# Patient Record
Sex: FEMALE | Race: WHITE | NOT HISPANIC OR LATINO | Employment: OTHER | URBAN - METROPOLITAN AREA
[De-identification: names, ages, dates, MRNs, and addresses within clinical notes are randomized per-mention and may not be internally consistent; named-entity substitution may affect disease eponyms.]

---

## 2023-02-01 PROBLEM — I34.1 MITRAL VALVE PROLAPSE: Status: ACTIVE | Noted: 2023-02-01

## 2023-02-01 PROBLEM — M54.9 BACK PAIN: Status: ACTIVE | Noted: 2023-02-01

## 2023-02-01 PROBLEM — R31.9 HEMATURIA: Status: ACTIVE | Noted: 2023-02-01

## 2023-02-01 PROBLEM — K86.9 PANCREATIC LESION (HHS-HCC): Status: ACTIVE | Noted: 2023-02-01

## 2023-02-01 PROBLEM — K52.9 CHRONIC DIARRHEA: Status: ACTIVE | Noted: 2023-02-01

## 2023-02-01 PROBLEM — R35.0 URINARY FREQUENCY: Status: ACTIVE | Noted: 2023-02-01

## 2023-02-01 PROBLEM — E78.5 HYPERLIPIDEMIA: Status: ACTIVE | Noted: 2023-02-01

## 2023-02-01 PROBLEM — E83.51 HYPOCALCEMIA: Status: ACTIVE | Noted: 2023-02-01

## 2023-02-01 PROBLEM — R61 NIGHT SWEATS: Status: ACTIVE | Noted: 2023-02-01

## 2023-02-01 PROBLEM — M10.9 GOUT: Status: ACTIVE | Noted: 2023-02-01

## 2023-02-01 PROBLEM — M79.672 BILATERAL FOOT PAIN: Status: ACTIVE | Noted: 2023-02-01

## 2023-02-01 PROBLEM — I25.10 ASCVD (ARTERIOSCLEROTIC CARDIOVASCULAR DISEASE): Status: ACTIVE | Noted: 2023-02-01

## 2023-02-01 PROBLEM — I50.9 CHF (CONGESTIVE HEART FAILURE) (MULTI): Status: ACTIVE | Noted: 2023-02-01

## 2023-02-01 PROBLEM — E55.9 VITAMIN D INSUFFICIENCY: Status: ACTIVE | Noted: 2023-02-01

## 2023-02-01 PROBLEM — R42 VERTIGO: Status: ACTIVE | Noted: 2023-02-01

## 2023-02-01 PROBLEM — M79.642 BILATERAL HAND PAIN: Status: ACTIVE | Noted: 2023-02-01

## 2023-02-01 PROBLEM — M19.90 OSTEOARTHRITIS: Status: ACTIVE | Noted: 2023-02-01

## 2023-02-01 PROBLEM — G56.23 ULNAR NEUROPATHY OF BOTH UPPER EXTREMITIES: Status: ACTIVE | Noted: 2023-02-01

## 2023-02-01 PROBLEM — G47.34 OXYGEN DESATURATION DURING SLEEP: Status: ACTIVE | Noted: 2023-02-01

## 2023-02-01 PROBLEM — K57.90 DIVERTICULOSIS: Status: ACTIVE | Noted: 2023-02-01

## 2023-02-01 PROBLEM — S23.29XA COSTOCHONDRAL SEPARATION: Status: ACTIVE | Noted: 2023-02-01

## 2023-02-01 PROBLEM — I42.9 CARDIOMYOPATHY (MULTI): Status: ACTIVE | Noted: 2023-02-01

## 2023-02-01 PROBLEM — N18.30 CKD (CHRONIC KIDNEY DISEASE), STAGE III (MULTI): Status: ACTIVE | Noted: 2023-02-01

## 2023-02-01 PROBLEM — J84.10 PULMONARY FIBROSIS (MULTI): Status: ACTIVE | Noted: 2023-02-01

## 2023-02-01 PROBLEM — I10 HYPERTENSION: Status: ACTIVE | Noted: 2023-02-01

## 2023-02-01 PROBLEM — I87.2 VENOUS INSUFFICIENCY: Status: ACTIVE | Noted: 2023-02-01

## 2023-02-01 PROBLEM — J84.9 ILD (INTERSTITIAL LUNG DISEASE) (MULTI): Status: ACTIVE | Noted: 2023-02-01

## 2023-02-01 PROBLEM — R82.90 ABNORMAL URINE FINDING: Status: ACTIVE | Noted: 2023-02-01

## 2023-02-01 PROBLEM — N39.0 ACUTE UTI: Status: ACTIVE | Noted: 2023-02-01

## 2023-02-01 PROBLEM — M79.641 BILATERAL HAND PAIN: Status: ACTIVE | Noted: 2023-02-01

## 2023-02-01 PROBLEM — M79.671 BILATERAL FOOT PAIN: Status: ACTIVE | Noted: 2023-02-01

## 2023-02-01 PROBLEM — J44.9 CHRONIC OBSTRUCTIVE PULMONARY DISEASE (MULTI): Status: ACTIVE | Noted: 2023-02-01

## 2023-02-01 PROBLEM — M47.816 LUMBAR SPONDYLOSIS: Status: ACTIVE | Noted: 2023-02-01

## 2023-02-01 PROBLEM — R49.0 HOARSENESS: Status: ACTIVE | Noted: 2023-02-01

## 2023-02-01 PROBLEM — R80.9 PROTEINURIA: Status: ACTIVE | Noted: 2023-02-01

## 2023-02-01 PROBLEM — D64.9 ANEMIA: Status: ACTIVE | Noted: 2023-02-01

## 2023-02-01 PROBLEM — R35.1 NOCTURIA: Status: ACTIVE | Noted: 2023-02-01

## 2023-02-01 PROBLEM — K63.5 COLON POLYP: Status: ACTIVE | Noted: 2023-02-01

## 2023-02-01 PROBLEM — J45.909 ASTHMA, MILD (HHS-HCC): Status: ACTIVE | Noted: 2023-02-01

## 2023-02-01 PROBLEM — M81.0 OSTEOPOROSIS: Status: ACTIVE | Noted: 2023-02-01

## 2023-02-01 PROBLEM — I07.1 TRICUSPID VALVE INSUFFICIENCY: Status: ACTIVE | Noted: 2023-02-01

## 2023-02-01 PROBLEM — N89.8 VAGINAL DISCHARGE, BLOODY: Status: ACTIVE | Noted: 2023-02-01

## 2023-02-01 PROBLEM — R10.32 LEFT LOWER QUADRANT ABDOMINAL PAIN: Status: ACTIVE | Noted: 2023-02-01

## 2023-02-01 PROBLEM — K42.9 UMBILICAL HERNIA: Status: ACTIVE | Noted: 2023-02-01

## 2023-02-01 PROBLEM — E11.43: Status: ACTIVE | Noted: 2023-02-01

## 2023-02-01 PROBLEM — K21.9 GERD WITHOUT ESOPHAGITIS: Status: ACTIVE | Noted: 2023-02-01

## 2023-02-01 PROBLEM — R60.9 EDEMA: Status: ACTIVE | Noted: 2023-02-01

## 2023-02-01 PROBLEM — K76.0 FATTY LIVER: Status: ACTIVE | Noted: 2023-02-01

## 2023-02-25 LAB
ALANINE AMINOTRANSFERASE (SGPT) (U/L) IN SER/PLAS: 10 U/L (ref 7–45)
ALBUMIN (G/DL) IN SER/PLAS: 4.2 G/DL (ref 3.4–5)
ALKALINE PHOSPHATASE (U/L) IN SER/PLAS: 70 U/L (ref 33–136)
ANION GAP IN SER/PLAS: 17 MMOL/L (ref 10–20)
ASPARTATE AMINOTRANSFERASE (SGOT) (U/L) IN SER/PLAS: 15 U/L (ref 9–39)
BASOPHILS (10*3/UL) IN BLOOD BY AUTOMATED COUNT: 0.04 X10E9/L (ref 0–0.1)
BASOPHILS/100 LEUKOCYTES IN BLOOD BY AUTOMATED COUNT: 0.3 % (ref 0–2)
BILIRUBIN TOTAL (MG/DL) IN SER/PLAS: 0.3 MG/DL (ref 0–1.2)
CALCIUM (MG/DL) IN SER/PLAS: 9.7 MG/DL (ref 8.6–10.6)
CARBON DIOXIDE, TOTAL (MMOL/L) IN SER/PLAS: 23 MMOL/L (ref 21–32)
CHLORIDE (MMOL/L) IN SER/PLAS: 102 MMOL/L (ref 98–107)
CREATININE (MG/DL) IN SER/PLAS: 2.55 MG/DL (ref 0.5–1.05)
EOSINOPHILS (10*3/UL) IN BLOOD BY AUTOMATED COUNT: 0.34 X10E9/L (ref 0–0.4)
EOSINOPHILS/100 LEUKOCYTES IN BLOOD BY AUTOMATED COUNT: 3 % (ref 0–6)
ERYTHROCYTE DISTRIBUTION WIDTH (RATIO) BY AUTOMATED COUNT: 14.4 % (ref 11.5–14.5)
ERYTHROCYTE MEAN CORPUSCULAR HEMOGLOBIN CONCENTRATION (G/DL) BY AUTOMATED: 32.7 G/DL (ref 32–36)
ERYTHROCYTE MEAN CORPUSCULAR VOLUME (FL) BY AUTOMATED COUNT: 93 FL (ref 80–100)
ERYTHROCYTES (10*6/UL) IN BLOOD BY AUTOMATED COUNT: 4.18 X10E12/L (ref 4–5.2)
GFR FEMALE: 19 ML/MIN/1.73M2
GLUCOSE (MG/DL) IN SER/PLAS: 68 MG/DL (ref 74–99)
HEMATOCRIT (%) IN BLOOD BY AUTOMATED COUNT: 38.8 % (ref 36–46)
HEMOGLOBIN (G/DL) IN BLOOD: 12.7 G/DL (ref 12–16)
IMMATURE GRANULOCYTES/100 LEUKOCYTES IN BLOOD BY AUTOMATED COUNT: 0.3 % (ref 0–0.9)
LEUKOCYTES (10*3/UL) IN BLOOD BY AUTOMATED COUNT: 11.5 X10E9/L (ref 4.4–11.3)
LYMPHOCYTES (10*3/UL) IN BLOOD BY AUTOMATED COUNT: 3.8 X10E9/L (ref 0.8–3)
LYMPHOCYTES/100 LEUKOCYTES IN BLOOD BY AUTOMATED COUNT: 33 % (ref 13–44)
MONOCYTES (10*3/UL) IN BLOOD BY AUTOMATED COUNT: 0.84 X10E9/L (ref 0.05–0.8)
MONOCYTES/100 LEUKOCYTES IN BLOOD BY AUTOMATED COUNT: 7.3 % (ref 2–10)
NEUTROPHILS (10*3/UL) IN BLOOD BY AUTOMATED COUNT: 6.47 X10E9/L (ref 1.6–5.5)
NEUTROPHILS/100 LEUKOCYTES IN BLOOD BY AUTOMATED COUNT: 56.1 % (ref 40–80)
NRBC (PER 100 WBCS) BY AUTOMATED COUNT: 0 /100 WBC (ref 0–0)
PLATELETS (10*3/UL) IN BLOOD AUTOMATED COUNT: 329 X10E9/L (ref 150–450)
POTASSIUM (MMOL/L) IN SER/PLAS: 5.7 MMOL/L (ref 3.5–5.3)
PROTEIN TOTAL: 7.6 G/DL (ref 6.4–8.2)
SODIUM (MMOL/L) IN SER/PLAS: 136 MMOL/L (ref 136–145)
UREA NITROGEN (MG/DL) IN SER/PLAS: 56 MG/DL (ref 6–23)

## 2023-03-02 LAB
ANION GAP IN SER/PLAS: 14 MMOL/L (ref 10–20)
BASOPHILS (10*3/UL) IN BLOOD BY AUTOMATED COUNT: 0.03 X10E9/L (ref 0–0.1)
BASOPHILS/100 LEUKOCYTES IN BLOOD BY AUTOMATED COUNT: 0.3 % (ref 0–2)
CALCIUM (MG/DL) IN SER/PLAS: 9.3 MG/DL (ref 8.6–10.3)
CARBON DIOXIDE, TOTAL (MMOL/L) IN SER/PLAS: 25 MMOL/L (ref 21–32)
CHLORIDE (MMOL/L) IN SER/PLAS: 102 MMOL/L (ref 98–107)
CREATININE (MG/DL) IN SER/PLAS: 1.5 MG/DL (ref 0.5–1.05)
EOSINOPHILS (10*3/UL) IN BLOOD BY AUTOMATED COUNT: 0.05 X10E9/L (ref 0–0.4)
EOSINOPHILS/100 LEUKOCYTES IN BLOOD BY AUTOMATED COUNT: 0.4 % (ref 0–6)
ERYTHROCYTE DISTRIBUTION WIDTH (RATIO) BY AUTOMATED COUNT: 14.4 % (ref 11.5–14.5)
ERYTHROCYTE MEAN CORPUSCULAR HEMOGLOBIN CONCENTRATION (G/DL) BY AUTOMATED: 33 G/DL (ref 32–36)
ERYTHROCYTE MEAN CORPUSCULAR VOLUME (FL) BY AUTOMATED COUNT: 93 FL (ref 80–100)
ERYTHROCYTES (10*6/UL) IN BLOOD BY AUTOMATED COUNT: 3.82 X10E12/L (ref 4–5.2)
GFR FEMALE: 35 ML/MIN/1.73M2
GLUCOSE (MG/DL) IN SER/PLAS: 242 MG/DL (ref 74–99)
HEMATOCRIT (%) IN BLOOD BY AUTOMATED COUNT: 35.5 % (ref 36–46)
HEMOGLOBIN (G/DL) IN BLOOD: 11.7 G/DL (ref 12–16)
IMMATURE GRANULOCYTES/100 LEUKOCYTES IN BLOOD BY AUTOMATED COUNT: 0.4 % (ref 0–0.9)
LEUKOCYTES (10*3/UL) IN BLOOD BY AUTOMATED COUNT: 11.4 X10E9/L (ref 4.4–11.3)
LYMPHOCYTES (10*3/UL) IN BLOOD BY AUTOMATED COUNT: 2.04 X10E9/L (ref 0.8–3)
LYMPHOCYTES/100 LEUKOCYTES IN BLOOD BY AUTOMATED COUNT: 18 % (ref 13–44)
MONOCYTES (10*3/UL) IN BLOOD BY AUTOMATED COUNT: 0.54 X10E9/L (ref 0.05–0.8)
MONOCYTES/100 LEUKOCYTES IN BLOOD BY AUTOMATED COUNT: 4.8 % (ref 2–10)
NEUTROPHILS (10*3/UL) IN BLOOD BY AUTOMATED COUNT: 8.65 X10E9/L (ref 1.6–5.5)
NEUTROPHILS/100 LEUKOCYTES IN BLOOD BY AUTOMATED COUNT: 76.1 % (ref 40–80)
PLATELETS (10*3/UL) IN BLOOD AUTOMATED COUNT: 280 X10E9/L (ref 150–450)
POTASSIUM (MMOL/L) IN SER/PLAS: 4.1 MMOL/L (ref 3.5–5.3)
SODIUM (MMOL/L) IN SER/PLAS: 137 MMOL/L (ref 136–145)
UREA NITROGEN (MG/DL) IN SER/PLAS: 26 MG/DL (ref 6–23)

## 2023-03-03 LAB — C. DIFFICILE TOXIN, PCR: NOT DETECTED

## 2023-03-08 ENCOUNTER — OFFICE VISIT (OUTPATIENT)
Dept: PRIMARY CARE | Facility: CLINIC | Age: 78
End: 2023-03-08
Payer: MEDICARE

## 2023-03-08 VITALS
BODY MASS INDEX: 26.96 KG/M2 | SYSTOLIC BLOOD PRESSURE: 106 MMHG | WEIGHT: 162 LBS | DIASTOLIC BLOOD PRESSURE: 56 MMHG | TEMPERATURE: 97.7 F

## 2023-03-08 DIAGNOSIS — J44.9 CHRONIC OBSTRUCTIVE PULMONARY DISEASE, UNSPECIFIED COPD TYPE (MULTI): ICD-10-CM

## 2023-03-08 DIAGNOSIS — E11.43 CONTROLLED TYPE 2 DIABETES MELLITUS WITH DIABETIC AUTONOMIC NEUROPATHY, WITH LONG-TERM CURRENT USE OF INSULIN (MULTI): ICD-10-CM

## 2023-03-08 DIAGNOSIS — J45.909 MILD ASTHMA, UNSPECIFIED WHETHER COMPLICATED, UNSPECIFIED WHETHER PERSISTENT (HHS-HCC): Primary | ICD-10-CM

## 2023-03-08 DIAGNOSIS — Z79.4 CONTROLLED TYPE 2 DIABETES MELLITUS WITH DIABETIC AUTONOMIC NEUROPATHY, WITH LONG-TERM CURRENT USE OF INSULIN (MULTI): ICD-10-CM

## 2023-03-08 PROCEDURE — 99214 OFFICE O/P EST MOD 30 MIN: CPT | Performed by: FAMILY MEDICINE

## 2023-03-08 PROCEDURE — 1036F TOBACCO NON-USER: CPT | Performed by: FAMILY MEDICINE

## 2023-03-08 PROCEDURE — 83036 HEMOGLOBIN GLYCOSYLATED A1C: CPT | Performed by: FAMILY MEDICINE

## 2023-03-08 PROCEDURE — 1157F ADVNC CARE PLAN IN RCRD: CPT | Performed by: FAMILY MEDICINE

## 2023-03-08 PROCEDURE — 1160F RVW MEDS BY RX/DR IN RCRD: CPT | Performed by: FAMILY MEDICINE

## 2023-03-08 PROCEDURE — 3078F DIAST BP <80 MM HG: CPT | Performed by: FAMILY MEDICINE

## 2023-03-08 PROCEDURE — 3074F SYST BP LT 130 MM HG: CPT | Performed by: FAMILY MEDICINE

## 2023-03-08 PROCEDURE — 1159F MED LIST DOCD IN RCRD: CPT | Performed by: FAMILY MEDICINE

## 2023-03-13 PROBLEM — R82.90 ABNORMAL URINE FINDING: Status: RESOLVED | Noted: 2023-02-01 | Resolved: 2023-03-13

## 2023-03-13 PROBLEM — N39.0 ACUTE UTI: Status: RESOLVED | Noted: 2023-02-01 | Resolved: 2023-03-13

## 2023-03-13 LAB — POC HEMOGLOBIN A1C: 6.4 % (ref 4.2–6.5)

## 2023-03-13 NOTE — PROGRESS NOTES
Subjective   Patient ID: [id number]     Genesis Canela is a 77 y.o. female who presents for Med Management.    HPI  Genesis is here to follow up on her medical problems today.  Review of Systems    PSYCH-No complaints regarding libido, appetite, memory or concentration;  no drug use or alcohol usage over six per week  SLEEP-No complaints of insomnia, apnea or restless legs  ALL/IMMUN-No history of sneezing or itching  HEM-No unexplained bruising or bleeding    Objective     /56 (BP Location: Left arm, Patient Position: Sitting)   Temp 36.5 °C (97.7 °F)   Wt 73.5 kg (162 lb)   BMI 26.96 kg/m²      Physical Exam    Heart- regular rate and rhythm, normal s1 and s2 without murmur or gallop;  pedal edema 2+  Lungs-clear to auscultation  Abdomen-soft, positive bowel sounds, without masses, HSmegaly or pain   Assessment/Plan     Problem List Items Addressed This Visit          Nervous    Controlled type 2 diabetes mellitus with diabetic autonomic neuropathy (CMS/HCC)    Relevant Orders    POCT glycosylated hemoglobin (Hb A1C) manually resulted (Completed)       Respiratory    Asthma, mild - Primary    Chronic obstructive pulmonary disease (CMS/HCC)       Ramez Vásquez MD

## 2023-03-17 ENCOUNTER — APPOINTMENT (OUTPATIENT)
Dept: PRIMARY CARE | Facility: CLINIC | Age: 78
End: 2023-03-17
Payer: MEDICARE

## 2023-04-04 ENCOUNTER — OFFICE VISIT (OUTPATIENT)
Dept: PRIMARY CARE | Facility: CLINIC | Age: 78
End: 2023-04-04
Payer: MEDICARE

## 2023-04-04 VITALS — SYSTOLIC BLOOD PRESSURE: 112 MMHG | DIASTOLIC BLOOD PRESSURE: 60 MMHG | TEMPERATURE: 98 F

## 2023-04-04 DIAGNOSIS — R35.0 URINARY FREQUENCY: Primary | ICD-10-CM

## 2023-04-04 DIAGNOSIS — M25.551 PAIN OF RIGHT HIP: ICD-10-CM

## 2023-04-04 PROCEDURE — 3078F DIAST BP <80 MM HG: CPT | Performed by: FAMILY MEDICINE

## 2023-04-04 PROCEDURE — 99213 OFFICE O/P EST LOW 20 MIN: CPT | Performed by: FAMILY MEDICINE

## 2023-04-04 PROCEDURE — 1159F MED LIST DOCD IN RCRD: CPT | Performed by: FAMILY MEDICINE

## 2023-04-04 PROCEDURE — 1036F TOBACCO NON-USER: CPT | Performed by: FAMILY MEDICINE

## 2023-04-04 PROCEDURE — 1157F ADVNC CARE PLAN IN RCRD: CPT | Performed by: FAMILY MEDICINE

## 2023-04-04 PROCEDURE — 3074F SYST BP LT 130 MM HG: CPT | Performed by: FAMILY MEDICINE

## 2023-04-04 PROCEDURE — 1160F RVW MEDS BY RX/DR IN RCRD: CPT | Performed by: FAMILY MEDICINE

## 2023-04-04 RX ORDER — NAPROXEN 500 MG/1
500 TABLET ORAL 2 TIMES DAILY PRN
Qty: 14 TABLET | Refills: 0 | Status: SHIPPED | OUTPATIENT
Start: 2023-04-04 | End: 2023-04-11

## 2023-04-04 RX ORDER — SULFAMETHOXAZOLE AND TRIMETHOPRIM 800; 160 MG/1; MG/1
1 TABLET ORAL DAILY
COMMUNITY
Start: 2023-03-08 | End: 2023-10-12 | Stop reason: ALTCHOICE

## 2023-04-04 NOTE — PROGRESS NOTES
Subjective   Patient ID: [id number]     Genesis Canela is a 77 y.o. female who presents for Right buttock pain.    HPI  Has had pain in right buttock for three days without injury;  this is interfering with walking;  not bad while sitting;  has some radiation down her right thigh; it is interfering with her making it to the bathroom;  worse when lifting her right leg while driving  Review of Systems    Neuro-no weakness  GI-No blood in stool, tarry stools, pain, vomiting, heartburn, constipation;  she has chronic diarrhea  PULM-No wheezing, coughing or shortness of breath  UROL-No incontinence; chronic urinary frequency    Objective     /60 (BP Location: Left arm, Patient Position: Sitting)   Temp 36.7 °C (98 °F) (Skin)      Physical Exam    Gen-patient unable to get on the table for exam;  here with walker  MS-no SI, CVA or spine tenderness; no deep gluteal pain; positive straight leg raising sign on the right  Abdomen-soft, without masses, or pain   Hip-examined in chair and appears to have normal range of motion    Assessment/Plan     Problem List Items Addressed This Visit          Other    Urinary frequency - Primary     Other Visit Diagnoses       Pain of right hip        Relevant Medications    naproxen (Naprosyn) 500 mg tablet    Other Relevant Orders    XR lumbar spine 4+ views w flexion extension    Referral to Physical Therapy            Ramez Vásquez MD

## 2023-04-04 NOTE — PATIENT INSTRUCTIONS
Do the back exercises provided about 30' after taking your medications. Call within the hour if you develop weakness in your legs, incontinence of stool or urine, or a fever.  If your back pain is not resolved within one week please return to be rechecked.

## 2023-04-24 ENCOUNTER — OFFICE VISIT (OUTPATIENT)
Dept: PRIMARY CARE | Facility: CLINIC | Age: 78
End: 2023-04-24
Payer: MEDICARE

## 2023-04-24 VITALS
WEIGHT: 161 LBS | DIASTOLIC BLOOD PRESSURE: 54 MMHG | TEMPERATURE: 97 F | SYSTOLIC BLOOD PRESSURE: 102 MMHG | BODY MASS INDEX: 26.79 KG/M2

## 2023-04-24 DIAGNOSIS — R10.12 LEFT UPPER QUADRANT ABDOMINAL PAIN: ICD-10-CM

## 2023-04-24 DIAGNOSIS — M81.0 OSTEOPOROSIS, UNSPECIFIED OSTEOPOROSIS TYPE, UNSPECIFIED PATHOLOGICAL FRACTURE PRESENCE: Primary | ICD-10-CM

## 2023-04-24 DIAGNOSIS — Z00.00 HEALTH CARE MAINTENANCE: ICD-10-CM

## 2023-04-24 DIAGNOSIS — Z12.31 ENCOUNTER FOR SCREENING MAMMOGRAM FOR MALIGNANT NEOPLASM OF BREAST: ICD-10-CM

## 2023-04-24 PROCEDURE — 36415 COLL VENOUS BLD VENIPUNCTURE: CPT | Performed by: FAMILY MEDICINE

## 2023-04-24 PROCEDURE — 1159F MED LIST DOCD IN RCRD: CPT | Performed by: FAMILY MEDICINE

## 2023-04-24 PROCEDURE — 1036F TOBACCO NON-USER: CPT | Performed by: FAMILY MEDICINE

## 2023-04-24 PROCEDURE — 3078F DIAST BP <80 MM HG: CPT | Performed by: FAMILY MEDICINE

## 2023-04-24 PROCEDURE — 1157F ADVNC CARE PLAN IN RCRD: CPT | Performed by: FAMILY MEDICINE

## 2023-04-24 PROCEDURE — 1160F RVW MEDS BY RX/DR IN RCRD: CPT | Performed by: FAMILY MEDICINE

## 2023-04-24 PROCEDURE — 85025 COMPLETE CBC W/AUTO DIFF WBC: CPT

## 2023-04-24 PROCEDURE — 80048 BASIC METABOLIC PNL TOTAL CA: CPT

## 2023-04-24 PROCEDURE — 99214 OFFICE O/P EST MOD 30 MIN: CPT | Performed by: FAMILY MEDICINE

## 2023-04-24 PROCEDURE — 3074F SYST BP LT 130 MM HG: CPT | Performed by: FAMILY MEDICINE

## 2023-04-24 RX ORDER — CIPROFLOXACIN 500 MG/1
1 TABLET ORAL 2 TIMES DAILY
COMMUNITY
Start: 2023-04-20 | End: 2023-05-01 | Stop reason: ALTCHOICE

## 2023-04-24 NOTE — PROGRESS NOTES
Subjective   Patient ID: [id number]     Genesis Canela is a 77 y.o. female who presents for Sciatica (Follow up for sciatica).    HPI  Saw Dr Santa and received a shot in her low back and that is feeling better withless limitation in ambulation now;    Review of Systems    GI- no UROL- has been on antibiotic for six months and is currently prescribed cipro by    ENDO- No change in hair, voice, skin, weight or temperature tolerance   MSK-No locking, giving way/swelling of joints; to start physical therapy on 02MAY2023  NEURO- No headaches, hx of concussion, falls in the last year or seizure  DERM-No rashes, blanching or  change in any moles     Objective     /54 (BP Location: Left arm, Patient Position: Sitting)   Temp 36.1 °C (97 °F) (Skin)   Wt 73 kg (161 lb)   BMI 26.79 kg/m²      Physical Exam    Neck-supple without lymphadenopathy or thyromegaly; no carotid bruits  Throat- without erythema or exudate, uvula in midlineNeck-supple without lymphadenopathy or thyromegaly; no carotid bruits  Heart- regular rate and rhythm, normal s1 and s2 without murmur or gallop  Lungs-clear to auscultation  Abdomen-soft, positive bowel sounds, without masses, HSmegaly or pain     Assessment/Plan     Problem List Items Addressed This Visit    None      Ramez Vásquez MD

## 2023-04-25 PROBLEM — D72.825 BANDEMIA: Status: ACTIVE | Noted: 2023-04-25

## 2023-04-25 LAB
ANION GAP IN SER/PLAS: 16 MMOL/L (ref 10–20)
BASOPHILS (10*3/UL) IN BLOOD BY AUTOMATED COUNT: 0.04 X10E9/L (ref 0–0.1)
BASOPHILS/100 LEUKOCYTES IN BLOOD BY AUTOMATED COUNT: 0.3 % (ref 0–2)
CALCIUM (MG/DL) IN SER/PLAS: 9.3 MG/DL (ref 8.6–10.6)
CARBON DIOXIDE, TOTAL (MMOL/L) IN SER/PLAS: 25 MMOL/L (ref 21–32)
CHLORIDE (MMOL/L) IN SER/PLAS: 104 MMOL/L (ref 98–107)
CREATININE (MG/DL) IN SER/PLAS: 1.04 MG/DL (ref 0.5–1.05)
EOSINOPHILS (10*3/UL) IN BLOOD BY AUTOMATED COUNT: 0.08 X10E9/L (ref 0–0.4)
EOSINOPHILS/100 LEUKOCYTES IN BLOOD BY AUTOMATED COUNT: 0.6 % (ref 0–6)
ERYTHROCYTE DISTRIBUTION WIDTH (RATIO) BY AUTOMATED COUNT: 14.1 % (ref 11.5–14.5)
ERYTHROCYTE MEAN CORPUSCULAR HEMOGLOBIN CONCENTRATION (G/DL) BY AUTOMATED: 30.8 G/DL (ref 32–36)
ERYTHROCYTE MEAN CORPUSCULAR VOLUME (FL) BY AUTOMATED COUNT: 97 FL (ref 80–100)
ERYTHROCYTES (10*6/UL) IN BLOOD BY AUTOMATED COUNT: 3.92 X10E12/L (ref 4–5.2)
GFR FEMALE: 54 ML/MIN/1.73M2
GLUCOSE (MG/DL) IN SER/PLAS: 99 MG/DL (ref 74–99)
HEMATOCRIT (%) IN BLOOD BY AUTOMATED COUNT: 38 % (ref 36–46)
HEMOGLOBIN (G/DL) IN BLOOD: 11.7 G/DL (ref 12–16)
IMMATURE GRANULOCYTES/100 LEUKOCYTES IN BLOOD BY AUTOMATED COUNT: 0.4 % (ref 0–0.9)
LEUKOCYTES (10*3/UL) IN BLOOD BY AUTOMATED COUNT: 13.3 X10E9/L (ref 4.4–11.3)
LYMPHOCYTES (10*3/UL) IN BLOOD BY AUTOMATED COUNT: 2.83 X10E9/L (ref 0.8–3)
LYMPHOCYTES/100 LEUKOCYTES IN BLOOD BY AUTOMATED COUNT: 21.3 % (ref 13–44)
MONOCYTES (10*3/UL) IN BLOOD BY AUTOMATED COUNT: 0.86 X10E9/L (ref 0.05–0.8)
MONOCYTES/100 LEUKOCYTES IN BLOOD BY AUTOMATED COUNT: 6.5 % (ref 2–10)
NEUTROPHILS (10*3/UL) IN BLOOD BY AUTOMATED COUNT: 9.45 X10E9/L (ref 1.6–5.5)
NEUTROPHILS/100 LEUKOCYTES IN BLOOD BY AUTOMATED COUNT: 70.9 % (ref 40–80)
NRBC (PER 100 WBCS) BY AUTOMATED COUNT: 0 /100 WBC (ref 0–0)
PLATELETS (10*3/UL) IN BLOOD AUTOMATED COUNT: 302 X10E9/L (ref 150–450)
POTASSIUM (MMOL/L) IN SER/PLAS: 4.6 MMOL/L (ref 3.5–5.3)
SODIUM (MMOL/L) IN SER/PLAS: 140 MMOL/L (ref 136–145)
UREA NITROGEN (MG/DL) IN SER/PLAS: 22 MG/DL (ref 6–23)

## 2023-05-01 ENCOUNTER — OFFICE VISIT (OUTPATIENT)
Dept: PRIMARY CARE | Facility: CLINIC | Age: 78
End: 2023-05-01
Payer: MEDICARE

## 2023-05-01 VITALS
DIASTOLIC BLOOD PRESSURE: 66 MMHG | BODY MASS INDEX: 26.63 KG/M2 | WEIGHT: 160 LBS | TEMPERATURE: 96.6 F | SYSTOLIC BLOOD PRESSURE: 114 MMHG

## 2023-05-01 DIAGNOSIS — A04.8 OTHER SPECIFIED BACTERIAL INTESTINAL INFECTIONS: ICD-10-CM

## 2023-05-01 DIAGNOSIS — K52.9 CHRONIC DIARRHEA: Primary | ICD-10-CM

## 2023-05-01 PROCEDURE — 99213 OFFICE O/P EST LOW 20 MIN: CPT | Performed by: FAMILY MEDICINE

## 2023-05-01 PROCEDURE — 3078F DIAST BP <80 MM HG: CPT | Performed by: FAMILY MEDICINE

## 2023-05-01 PROCEDURE — 1157F ADVNC CARE PLAN IN RCRD: CPT | Performed by: FAMILY MEDICINE

## 2023-05-01 PROCEDURE — 36415 COLL VENOUS BLD VENIPUNCTURE: CPT | Performed by: FAMILY MEDICINE

## 2023-05-01 PROCEDURE — 85025 COMPLETE CBC W/AUTO DIFF WBC: CPT

## 2023-05-01 PROCEDURE — 1159F MED LIST DOCD IN RCRD: CPT | Performed by: FAMILY MEDICINE

## 2023-05-01 PROCEDURE — 3074F SYST BP LT 130 MM HG: CPT | Performed by: FAMILY MEDICINE

## 2023-05-01 PROCEDURE — 80048 BASIC METABOLIC PNL TOTAL CA: CPT

## 2023-05-01 PROCEDURE — 1160F RVW MEDS BY RX/DR IN RCRD: CPT | Performed by: FAMILY MEDICINE

## 2023-05-01 PROCEDURE — 1036F TOBACCO NON-USER: CPT | Performed by: FAMILY MEDICINE

## 2023-05-01 NOTE — PROGRESS NOTES
Subjective   Patient ID: [id number]     Genesis Canela is a 77 y.o. female who presents for Follow-up (Abnormal labs).    HPI  Genesis states that she has had pain in left upper quadrant since 20APR2023;  has been treated for GERD, diverticulitis and right sided sciatica in the preceding months but this pain is different;  a CT scan is pending approval for the 9th of MAY  Review of Systems    CARDIO- No chest pain or pressure, nausea, diaphoresis, paresthesias, dizziness, or syncope with or without exertion  GI-No blood in stool, tarry stools, pain, vomiting, heartburn (less than weekly), constipation but has had diarrhea for months  UROL-finished three day course of cipro for UTI today and sees Dr Brian Patino in one week    Objective     /66 (BP Location: Left arm, Patient Position: Sitting)   Temp 35.9 °C (96.6 °F) (Skin)   Wt 72.6 kg (160 lb)   BMI 26.63 kg/m²      Physical Exam    Gen- patient in NAD  Heart- regular rate and rhythm, normal s1 and s2 without murmur or gallop  Lungs-clear to auscultation  Abdomen-soft, positive bowel sounds, without masses, HSmegaly but with exquisite left upper and left lower quadrant pain without rebound or Rovsing;  no CVA or spine tenderness    Assessment/Plan     Problem List Items Addressed This Visit          Digestive    Chronic diarrhea - Primary    Relevant Orders    C. difficile, PCR    CBC and Auto Differential    Basic Metabolic Panel    Stool Pathogen Panel, PCR    Ova/Para + Giardia/Cryptosporidium Antigen     Other Visit Diagnoses       Other specified bacterial intestinal infections        Relevant Orders    Stool Pathogen Panel, PCR            Ramez Vásquez MD

## 2023-05-02 LAB
ANION GAP IN SER/PLAS: 15 MMOL/L (ref 10–20)
BASOPHILS (10*3/UL) IN BLOOD BY AUTOMATED COUNT: 0.05 X10E9/L (ref 0–0.1)
BASOPHILS/100 LEUKOCYTES IN BLOOD BY AUTOMATED COUNT: 0.4 % (ref 0–2)
CALCIUM (MG/DL) IN SER/PLAS: 8.5 MG/DL (ref 8.6–10.6)
CARBON DIOXIDE, TOTAL (MMOL/L) IN SER/PLAS: 25 MMOL/L (ref 21–32)
CHLORIDE (MMOL/L) IN SER/PLAS: 107 MMOL/L (ref 98–107)
CREATININE (MG/DL) IN SER/PLAS: 1.12 MG/DL (ref 0.5–1.05)
EOSINOPHILS (10*3/UL) IN BLOOD BY AUTOMATED COUNT: 0.27 X10E9/L (ref 0–0.4)
EOSINOPHILS/100 LEUKOCYTES IN BLOOD BY AUTOMATED COUNT: 2.3 % (ref 0–6)
ERYTHROCYTE DISTRIBUTION WIDTH (RATIO) BY AUTOMATED COUNT: 13.7 % (ref 11.5–14.5)
ERYTHROCYTE MEAN CORPUSCULAR HEMOGLOBIN CONCENTRATION (G/DL) BY AUTOMATED: 30.9 G/DL (ref 32–36)
ERYTHROCYTE MEAN CORPUSCULAR VOLUME (FL) BY AUTOMATED COUNT: 96 FL (ref 80–100)
ERYTHROCYTES (10*6/UL) IN BLOOD BY AUTOMATED COUNT: 3.82 X10E12/L (ref 4–5.2)
GFR FEMALE: 50 ML/MIN/1.73M2
GLUCOSE (MG/DL) IN SER/PLAS: 231 MG/DL (ref 74–99)
HEMATOCRIT (%) IN BLOOD BY AUTOMATED COUNT: 36.6 % (ref 36–46)
HEMOGLOBIN (G/DL) IN BLOOD: 11.3 G/DL (ref 12–16)
IMMATURE GRANULOCYTES/100 LEUKOCYTES IN BLOOD BY AUTOMATED COUNT: 0.3 % (ref 0–0.9)
LEUKOCYTES (10*3/UL) IN BLOOD BY AUTOMATED COUNT: 11.7 X10E9/L (ref 4.4–11.3)
LYMPHOCYTES (10*3/UL) IN BLOOD BY AUTOMATED COUNT: 2.94 X10E9/L (ref 0.8–3)
LYMPHOCYTES/100 LEUKOCYTES IN BLOOD BY AUTOMATED COUNT: 25 % (ref 13–44)
MONOCYTES (10*3/UL) IN BLOOD BY AUTOMATED COUNT: 0.68 X10E9/L (ref 0.05–0.8)
MONOCYTES/100 LEUKOCYTES IN BLOOD BY AUTOMATED COUNT: 5.8 % (ref 2–10)
NEUTROPHILS (10*3/UL) IN BLOOD BY AUTOMATED COUNT: 7.76 X10E9/L (ref 1.6–5.5)
NEUTROPHILS/100 LEUKOCYTES IN BLOOD BY AUTOMATED COUNT: 66.2 % (ref 40–80)
PLATELETS (10*3/UL) IN BLOOD AUTOMATED COUNT: 302 X10E9/L (ref 150–450)
POTASSIUM (MMOL/L) IN SER/PLAS: 4.7 MMOL/L (ref 3.5–5.3)
SODIUM (MMOL/L) IN SER/PLAS: 142 MMOL/L (ref 136–145)
UREA NITROGEN (MG/DL) IN SER/PLAS: 21 MG/DL (ref 6–23)

## 2023-05-05 ENCOUNTER — APPOINTMENT (OUTPATIENT)
Dept: PRIMARY CARE | Facility: CLINIC | Age: 78
End: 2023-05-05
Payer: MEDICARE

## 2023-05-05 ENCOUNTER — TELEPHONE (OUTPATIENT)
Dept: PRIMARY CARE | Facility: CLINIC | Age: 78
End: 2023-05-05

## 2023-05-05 NOTE — TELEPHONE ENCOUNTER
Received an email from precert dept for Genesis's CT abdomen. Called [facility] at [phone] regarding case# [id number] and spoke with Nubia CARR who told me that this was denied and a peer to peer could not be done. An appeal must be faxed to [employer]. She said we need to fax the office note and denial letter to [phone]. I explained that we do not have a denial letter so she will fax that to us.

## 2023-05-10 ENCOUNTER — PATIENT OUTREACH (OUTPATIENT)
Dept: CARE COORDINATION | Facility: CLINIC | Age: 78
End: 2023-05-10
Payer: MEDICARE

## 2023-05-16 ENCOUNTER — OFFICE VISIT (OUTPATIENT)
Dept: PRIMARY CARE | Facility: CLINIC | Age: 78
End: 2023-05-16
Payer: MEDICARE

## 2023-05-16 VITALS
DIASTOLIC BLOOD PRESSURE: 62 MMHG | TEMPERATURE: 97.1 F | BODY MASS INDEX: 26.79 KG/M2 | SYSTOLIC BLOOD PRESSURE: 128 MMHG | WEIGHT: 161 LBS

## 2023-05-16 DIAGNOSIS — K57.92 DIVERTICULITIS: Primary | ICD-10-CM

## 2023-05-16 PROCEDURE — 3078F DIAST BP <80 MM HG: CPT | Performed by: FAMILY MEDICINE

## 2023-05-16 PROCEDURE — 1159F MED LIST DOCD IN RCRD: CPT | Performed by: FAMILY MEDICINE

## 2023-05-16 PROCEDURE — 1160F RVW MEDS BY RX/DR IN RCRD: CPT | Performed by: FAMILY MEDICINE

## 2023-05-16 PROCEDURE — 1157F ADVNC CARE PLAN IN RCRD: CPT | Performed by: FAMILY MEDICINE

## 2023-05-16 PROCEDURE — 99213 OFFICE O/P EST LOW 20 MIN: CPT | Performed by: FAMILY MEDICINE

## 2023-05-16 PROCEDURE — 1036F TOBACCO NON-USER: CPT | Performed by: FAMILY MEDICINE

## 2023-05-16 PROCEDURE — 3074F SYST BP LT 130 MM HG: CPT | Performed by: FAMILY MEDICINE

## 2023-05-16 RX ORDER — AMOXICILLIN AND CLAVULANATE POTASSIUM 875; 125 MG/1; MG/1
1 TABLET, FILM COATED ORAL 2 TIMES DAILY
COMMUNITY
Start: 2023-05-05 | End: 2023-08-09 | Stop reason: ALTCHOICE

## 2023-05-16 RX ORDER — VIBEGRON 75 MG/1
1 TABLET, FILM COATED ORAL ONCE
COMMUNITY
End: 2023-10-12 | Stop reason: ALTCHOICE

## 2023-05-16 NOTE — PROGRESS NOTES
Subjective   Patient ID: [id number]     Genesis Canela is a 77 y.o. female who presents for Hospital Follow-up.    HPI  Has been to the Emergency at [facility] twice in the last week for pain , constipation (previous diarrhea had resolved)and emesis, and has been put on multiple antibiotic therapy for diverticulitis (Bactrim & Amoxicillin) with persistent belly pain but resolution of her constipation (went eight days) and is in much less pain now  Review of Systems    ID-no fever or chills  CARDIO- No chest pain or pressure, nausea, diaphoresis, paresthesias, dizziness, or syncope with or without exertion  GI-No blood in stool, tarry stools, pain, vomiting, heartburn, constipation or diarrhea      Objective     /62 (BP Location: Left arm, Patient Position: Sitting)   Temp 36.2 °C (97.1 °F) (Skin)   Wt 73 kg (161 lb)   BMI 26.79 kg/m²      Physical Exam    Neck-supple without lymphadenopathy or thyromegaly; no carotid bruits  Throat- without erythema or exudate, uvula in midlineNeck-supple without lymphadenopathy or thyromegaly; no carotid bruits  Heart- regular rate and rhythm, normal s1 and s2 without murmur or gallop  Lungs-clear to auscultation  Abdomen-soft, positive bowel sounds, without masses, HSmegaly or pain     Assessment/Plan     Problem List Items Addressed This Visit          Infectious/Inflammatory    Diverticulitis - Primary     Call if you have any further problems.  Return in three months fasting.  Ramez Vásquez MD

## 2023-06-01 ENCOUNTER — LAB (OUTPATIENT)
Dept: LAB | Facility: LAB | Age: 78
End: 2023-06-01
Payer: MEDICARE

## 2023-06-01 DIAGNOSIS — K52.9 CHRONIC DIARRHEA: ICD-10-CM

## 2023-06-02 ENCOUNTER — TELEPHONE (OUTPATIENT)
Dept: PRIMARY CARE | Facility: CLINIC | Age: 78
End: 2023-06-02
Payer: MEDICARE

## 2023-06-02 DIAGNOSIS — K52.9 CHRONIC DIARRHEA: ICD-10-CM

## 2023-06-02 NOTE — TELEPHONE ENCOUNTER
Barrett from  lab calling in regards to the Crypto order that was place. Said the lab order has been canceled out do to the packaging of the media pack, states that is has to be in a clear container.     Thank you.

## 2023-06-07 LAB — URINE CULTURE: ABNORMAL

## 2023-06-09 LAB
CRYPTOSPORIDIUM ANTIGEN-DATA CONVERSION: NORMAL
GIARDIA LAMBLIA AG-DATA CONVERSION: NORMAL
MISCELLANEUOUS TEST RESULT: NORMAL
NAME OF SENDOUT TEST: NORMAL
OVA + PARASITE EXAM: NORMAL

## 2023-07-06 ENCOUNTER — TELEPHONE (OUTPATIENT)
Dept: PRIMARY CARE | Facility: CLINIC | Age: 78
End: 2023-07-06
Payer: MEDICARE

## 2023-07-06 DIAGNOSIS — M81.0 OSTEOPOROSIS, UNSPECIFIED OSTEOPOROSIS TYPE, UNSPECIFIED PATHOLOGICAL FRACTURE PRESENCE: Primary | ICD-10-CM

## 2023-07-06 RX ORDER — ALENDRONATE SODIUM 70 MG/1
70 TABLET ORAL
Qty: 12 TABLET | Refills: 1 | Status: SHIPPED | OUTPATIENT
Start: 2023-07-06 | End: 2024-01-02

## 2023-07-06 NOTE — TELEPHONE ENCOUNTER
Refill:    Alendroante 70mg daily    Pharm: [facility] # [phone]    LR: 11/10/22 qty 12 w/ 1 refill  LV: 05/16/23  NV: 08/09/23

## 2023-07-11 LAB — URINE CULTURE: ABNORMAL

## 2023-08-09 ENCOUNTER — APPOINTMENT (OUTPATIENT)
Dept: PRIMARY CARE | Facility: CLINIC | Age: 78
End: 2023-08-09
Payer: MEDICARE

## 2023-08-09 ENCOUNTER — OFFICE VISIT (OUTPATIENT)
Dept: PRIMARY CARE | Facility: CLINIC | Age: 78
End: 2023-08-09
Payer: MEDICARE

## 2023-08-09 VITALS
DIASTOLIC BLOOD PRESSURE: 68 MMHG | BODY MASS INDEX: 28.7 KG/M2 | SYSTOLIC BLOOD PRESSURE: 112 MMHG | HEIGHT: 61 IN | WEIGHT: 152 LBS

## 2023-08-09 DIAGNOSIS — Z00.00 ROUTINE GENERAL MEDICAL EXAMINATION AT HEALTH CARE FACILITY: Primary | ICD-10-CM

## 2023-08-09 DIAGNOSIS — E78.5 HYPERLIPIDEMIA, UNSPECIFIED HYPERLIPIDEMIA TYPE: ICD-10-CM

## 2023-08-09 DIAGNOSIS — K52.9 CHRONIC DIARRHEA: ICD-10-CM

## 2023-08-09 DIAGNOSIS — N18.31 STAGE 3A CHRONIC KIDNEY DISEASE (MULTI): ICD-10-CM

## 2023-08-09 DIAGNOSIS — E11.43 CONTROLLED TYPE 2 DIABETES MELLITUS WITH DIABETIC AUTONOMIC NEUROPATHY, WITH LONG-TERM CURRENT USE OF INSULIN (MULTI): ICD-10-CM

## 2023-08-09 DIAGNOSIS — M81.0 OSTEOPOROSIS, UNSPECIFIED OSTEOPOROSIS TYPE, UNSPECIFIED PATHOLOGICAL FRACTURE PRESENCE: ICD-10-CM

## 2023-08-09 DIAGNOSIS — I07.1 TRICUSPID VALVE INSUFFICIENCY, UNSPECIFIED ETIOLOGY: ICD-10-CM

## 2023-08-09 DIAGNOSIS — I42.9 CARDIOMYOPATHY, UNSPECIFIED TYPE (MULTI): ICD-10-CM

## 2023-08-09 DIAGNOSIS — Z79.4 CONTROLLED TYPE 2 DIABETES MELLITUS WITH DIABETIC AUTONOMIC NEUROPATHY, WITH LONG-TERM CURRENT USE OF INSULIN (MULTI): ICD-10-CM

## 2023-08-09 DIAGNOSIS — I10 HYPERTENSION, UNSPECIFIED TYPE: ICD-10-CM

## 2023-08-09 PROBLEM — K57.92 DIVERTICULITIS: Status: RESOLVED | Noted: 2023-05-16 | Resolved: 2023-08-09

## 2023-08-09 PROBLEM — S23.29XA COSTOCHONDRAL SEPARATION: Status: RESOLVED | Noted: 2023-02-01 | Resolved: 2023-08-09

## 2023-08-09 LAB
POC ALBUMIN /CREATININE RATIO MANUALLY ENTERED: ABNORMAL UG/MG CREAT
POC FINGERSTICK BLOOD GLUCOSE: 141 MG/DL (ref 70–100)
POC HDL CHOLESTEROL: 19 MG/DL (ref 0–50)
POC LDL CHOLESTEROL: ABNORMAL
POC NON-HDL CHOLESTEROL: ABNORMAL
POC TOTAL CHOLESTEROL/HDL RATIO: ABNORMAL
POC TOTAL CHOLESTEROL: ABNORMAL
POC TRIGLYCERIDES: 167 MG/DL (ref 0–150)
POC URINE ALBUMIN: 150 MG/L
POC URINE CREATININE: 200 MG/DL

## 2023-08-09 PROCEDURE — 1170F FXNL STATUS ASSESSED: CPT | Performed by: FAMILY MEDICINE

## 2023-08-09 PROCEDURE — G0444 DEPRESSION SCREEN ANNUAL: HCPCS | Performed by: FAMILY MEDICINE

## 2023-08-09 PROCEDURE — 1160F RVW MEDS BY RX/DR IN RCRD: CPT | Performed by: FAMILY MEDICINE

## 2023-08-09 PROCEDURE — 99214 OFFICE O/P EST MOD 30 MIN: CPT | Performed by: FAMILY MEDICINE

## 2023-08-09 PROCEDURE — 1126F AMNT PAIN NOTED NONE PRSNT: CPT | Performed by: FAMILY MEDICINE

## 2023-08-09 PROCEDURE — 1036F TOBACCO NON-USER: CPT | Performed by: FAMILY MEDICINE

## 2023-08-09 PROCEDURE — G0439 PPPS, SUBSEQ VISIT: HCPCS | Performed by: FAMILY MEDICINE

## 2023-08-09 PROCEDURE — G0442 ANNUAL ALCOHOL SCREEN 15 MIN: HCPCS | Performed by: FAMILY MEDICINE

## 2023-08-09 PROCEDURE — 80061 LIPID PANEL: CPT | Performed by: FAMILY MEDICINE

## 2023-08-09 PROCEDURE — 3078F DIAST BP <80 MM HG: CPT | Performed by: FAMILY MEDICINE

## 2023-08-09 PROCEDURE — 1159F MED LIST DOCD IN RCRD: CPT | Performed by: FAMILY MEDICINE

## 2023-08-09 PROCEDURE — 3074F SYST BP LT 130 MM HG: CPT | Performed by: FAMILY MEDICINE

## 2023-08-09 PROCEDURE — 82044 UR ALBUMIN SEMIQUANTITATIVE: CPT | Performed by: FAMILY MEDICINE

## 2023-08-09 PROCEDURE — 1123F ACP DISCUSS/DSCN MKR DOCD: CPT | Performed by: FAMILY MEDICINE

## 2023-08-09 PROCEDURE — 82962 GLUCOSE BLOOD TEST: CPT | Performed by: FAMILY MEDICINE

## 2023-08-09 PROCEDURE — 1157F ADVNC CARE PLAN IN RCRD: CPT | Performed by: FAMILY MEDICINE

## 2023-08-09 RX ORDER — ELECTROLYTES/DEXTROSE
1 SOLUTION, ORAL ORAL 2 TIMES DAILY
COMMUNITY

## 2023-08-09 ASSESSMENT — ACTIVITIES OF DAILY LIVING (ADL)
DOING_HOUSEWORK: INDEPENDENT
GROCERY_SHOPPING: INDEPENDENT
TAKING_MEDICATION: INDEPENDENT
DRESSING: INDEPENDENT
MANAGING_FINANCES: INDEPENDENT
BATHING: INDEPENDENT

## 2023-08-09 ASSESSMENT — PATIENT HEALTH QUESTIONNAIRE - PHQ9
1. LITTLE INTEREST OR PLEASURE IN DOING THINGS: NOT AT ALL
2. FEELING DOWN, DEPRESSED OR HOPELESS: NOT AT ALL
SUM OF ALL RESPONSES TO PHQ9 QUESTIONS 1 AND 2: 0

## 2023-08-09 ASSESSMENT — ENCOUNTER SYMPTOMS
LOSS OF SENSATION IN FEET: 1
DEPRESSION: 0
OCCASIONAL FEELINGS OF UNSTEADINESS: 1

## 2023-08-09 NOTE — PROGRESS NOTES
"Subjective   Reason for Visit: Genesis Canela is an 77 y.o. female here for a Medicare Wellness visit.     Past Medical, Surgical, and Family History reviewed and updated in chart.    Reviewed all medications by prescribing practitioner or clinical pharmacist (such as prescriptions, OTCs, herbal therapies and supplements) and documented in the medical record.    In the past 2 weeks this patient has not felt down, depressed, hopeless, lost interest or pleasure in doing things or thought of harming herself or others. The patient has not fallen in the last six months and has no loose rugs,  uneven floors or poor lighting at home.Advance Care Planning discussion was held.  The patient has no spiritual/Confucianist/cultural values or needs that we need to know. The patient does not feel threatened or abused physically, emotionally or sexually.  The patient feels safe in the home.  In the past month, there was not a day when the patient of anyone in the patient's family went hungry because there was not enough food.  The patient denies that they or the person with them has problems with hearing, speaking, reading, moving around or learning.  The patient states they are comfortable filling out medical forms.      HPI    Patient Care Team:  Ramez Vásquez MD as PCP - General  Ramez Vásquez MD as PCP - [employer] PCP  Jesus Jerry MD as Referring Physician (Ophthalmology)     Review of Systems  Musculoskeletal-No locking, giving way/swelling of joints  Neurology- No headaches, hx of concussion, falls in the last year or seizure  Allergy/Immunology-No history of sneezing or itching  Dermatology-No rashes, blanching or  change in any moles    Objective   Vitals:  /68 (BP Location: Left arm, Patient Position: Sitting)   Ht 1.549 m (5' 1\")   Wt 68.9 kg (152 lb)   BMI 28.72 kg/m²       Physical Exam  Neck-supple without lymphadenopathy or thyromegaly; no carotid bruits  Heart- regular rate and " rhythm, normal s1 and s2 without gallop;  1+ edema;  grade II systolic murmur   Lungs-clear to auscultation  Abdomen-soft, positive bowel sounds, without masses, HSmegaly or pain   Foot Exam-normal propioceptive, but vibration decreased to patellae and and monofilament decreased to bilateral mid lower legs;  pulses not palpated; normal temperature; no hair;  skin intact with numerous keratosis;      Assessment/Plan   Problem List Items Addressed This Visit       Cardiomyopathy (CMS/[facility])    Chronic diarrhea    Current Assessment & Plan     Dr Florian         CKD (chronic kidney disease), stage III (CMS/[facility])    Controlled type 2 diabetes mellitus with diabetic autonomic neuropathy (CMS/[facility])    Relevant Orders    POCT Albumin random urine manually resulted    POCT fingerstick glucose manually resulted (Completed)    POCT Lipid Panel manually resulted (Completed)    Hyperlipidemia    Hypertension    Osteoporosis    Tricuspid valve insufficiency     Other Visit Diagnoses       Routine general medical examination at health care facility    -  Primary          Please check with your insurance to verify whether you should get your shingles vaccination here or at the pharmacy, and whether it is covered.  The purpose of this shot is to prevent the permanent unremitting pain of Post Herpetic Neuralgia which becomes more common in elderly patients that get Shingles.  This shot can be very expensive.    Continue with you r physical therapy to prevent falls.    Check your feet daily for sores or cuts since you can't feel them as well as you used to.    Follow up fasting (no alcohol for 48 hours and just water for 14 hours) in six months for your next routine appointment.  In general, take any medications on schedule (except for types of Insulin).       Patient was identified as a fall risk. Risk prevention instructions provided.

## 2023-08-09 NOTE — PATIENT INSTRUCTIONS
Please check with your insurance to verify whether you should get your shingles vaccination here or at the pharmacy, and whether it is covered.  The purpose of this shot is to prevent the permanent unremitting pain of Post Herpetic Neuralgia which becomes more common in elderly patients that get Shingles.  This shot can be very expensive.    Continue with you r physical therapy to prevent falls.    Check your feet daily for sores or cuts since you can't feel them as well as you used to.    Follow up fasting (no alcohol for 48 hours and just water for 14 hours) in six months for your next routine appointment.  In general, take any medications on schedule (except for types of Insulin).            Ways to Help Prevent Falls at Home    Quick Tips   ? Ask for help if you need it. Most people want to help!   ? Get up slowly after sitting or laying down   ? Wear a medical alert device or keep cell phone in your pocket   ? Use night lights, especially areas near a bathroom   ? Keep the items you use often within reach on a small stool or end table   ? Use an assistive device such as walker or cane, as directed by provider/physical therapy   ? Use a non-slip mat and grab bars in your bathroom. Look for home health sections for best options     Other Areas to Focus On   ? Exercise and nutrition: Regular exercise or taking a falls prevention class are great ways improve strength and balance. Don’t forget to stay hydrated and bring a snack!   ? Medicine side effects: Some medicines can make you sleepy or dizzy, which could cause a fall. Ask your healthcare provider about the side effects your medicines could cause. Be sure to let them know if you take any vitamins or supplements as well.   ? Tripping hazards: Remove items you could trip on, such as loose mats, rugs, cords, and clutter. Wear closed toe shoes with rubber soles.   ? Health and wellness: Get regular checkups with your healthcare provider, plus routine vision and  hearing screenings. Talk with your healthcare provider about:   o Your medicines and the possible side effects - bring them in a bag if that is easier!   o Problems with balance or feeling dizzy   o Ways to promote bone health, such as Vitamin D and calcium supplements   o Questions or concerns about falling     *Ask your healthcare team if you have questions     ©[facility], 2022

## 2023-08-09 NOTE — PROGRESS NOTES
"Subjective   Patient ID: [id number]     Genesis Canela is a 77 y.o. female who presents for Medicare Annual Wellness Visit Subsequent.    HPI  Woke up with sugar of 77 an so she ate something; has had persistent pain after her diverticulitis was resolved  with antibiotics (this is followed by Dr Florian); she continues to have the diarrhea    Review of Systems  ENDO- No change in hair, voice, skin, weight or temperature tolerance   MSK-No locking, giving way/swelling of joints  NEURO- No headaches, hx of concussion, falls in the last year or seizure  DERM-No rashes, blanching or  change in any moles     Objective     /68 (BP Location: Left arm, Patient Position: Sitting)   Ht 1.549 m (5' 1\")   Wt 68.9 kg (152 lb)   BMI 28.72 kg/m²      Physical Exam  Neck-supple without lymphadenopathy or thyromegaly; no carotid bruits  Throat- without erythema or exudate, uvula in midlineNeck-supple without lymphadenopathy or thyromegaly; no carotid bruits  Heart- regular rate and rhythm, normal s1 and s2 without  gallop;  murmur systolic grade II;  1+ a edema of ankles  Lungs-clear to auscultation  Abdomen-soft, positive bowel sounds, without masses, HSmegaly or pain   Foot Exam-normal propioceptive, but decreased vibration to bilateral paltellae and monofilament to mid calves;  pulses not palpable, temperature normal ; no hair distribution, skin integrity intact but with many actinic keratosis and color pink        Assessment/Plan     Problem List Items Addressed This Visit       Cardiomyopathy (CMS/HCC)    Chronic diarrhea - Primary     Dr Florian         CKD (chronic kidney disease), stage III (CMS/[facility])    Controlled type 2 diabetes mellitus with diabetic autonomic neuropathy (CMS/[facility])    Relevant Orders    POCT Albumin random urine manually resulted    POCT fingerstick glucose manually resulted    POCT Lipid Panel manually resulted    Hyperlipidemia    Hypertension    Osteoporosis    Tricuspid valve insufficiency "     Please check with your insurance to verify whether you should get your shingles vaccination here or at the pharmacy, and whether it is covered.  The purpose of this shot is to prevent the permanent unremitting pain of Post Herpetic Neuralgia which becomes more common in elderly patients that get Shingles.  This shot can be very expensive.    Continue with you r physical therapy to prevent falls.    Check your feet daily for sores or cuts since you can't feel them as well as you used to.    Follow up in three months for your next routine appointment.       Ramez Vásquez MD

## 2023-10-02 ENCOUNTER — HOSPITAL ENCOUNTER (OUTPATIENT)
Dept: RADIOLOGY | Facility: HOSPITAL | Age: 78
Discharge: HOME | End: 2023-10-02
Payer: MEDICARE

## 2023-10-02 DIAGNOSIS — N39.0 RECURRENT UTI: ICD-10-CM

## 2023-10-02 PROCEDURE — 74177 CT ABD & PELVIS W/CONTRAST: CPT

## 2023-10-02 PROCEDURE — 2550000001 HC RX 255 CONTRASTS: Performed by: UROLOGY

## 2023-10-02 PROCEDURE — 74177 CT ABD & PELVIS W/CONTRAST: CPT | Performed by: RADIOLOGY

## 2023-10-02 RX ADMIN — IOHEXOL 75 ML: 350 INJECTION, SOLUTION INTRAVENOUS at 13:20

## 2023-10-12 ENCOUNTER — OFFICE VISIT (OUTPATIENT)
Dept: PRIMARY CARE | Facility: CLINIC | Age: 78
End: 2023-10-12
Payer: MEDICARE

## 2023-10-12 VITALS
WEIGHT: 155 LBS | BODY MASS INDEX: 29.29 KG/M2 | TEMPERATURE: 97.9 F | SYSTOLIC BLOOD PRESSURE: 106 MMHG | DIASTOLIC BLOOD PRESSURE: 62 MMHG

## 2023-10-12 DIAGNOSIS — J45.909 MILD ASTHMA, UNSPECIFIED WHETHER COMPLICATED, UNSPECIFIED WHETHER PERSISTENT (HHS-HCC): ICD-10-CM

## 2023-10-12 DIAGNOSIS — J32.1 FRONTAL SINUSITIS, UNSPECIFIED CHRONICITY: Primary | ICD-10-CM

## 2023-10-12 PROBLEM — J30.89 NON-SEASONAL ALLERGIC RHINITIS: Status: ACTIVE | Noted: 2023-10-12

## 2023-10-12 PROCEDURE — 1160F RVW MEDS BY RX/DR IN RCRD: CPT | Performed by: FAMILY MEDICINE

## 2023-10-12 PROCEDURE — 99213 OFFICE O/P EST LOW 20 MIN: CPT | Performed by: FAMILY MEDICINE

## 2023-10-12 PROCEDURE — 1126F AMNT PAIN NOTED NONE PRSNT: CPT | Performed by: FAMILY MEDICINE

## 2023-10-12 PROCEDURE — 3074F SYST BP LT 130 MM HG: CPT | Performed by: FAMILY MEDICINE

## 2023-10-12 PROCEDURE — 3078F DIAST BP <80 MM HG: CPT | Performed by: FAMILY MEDICINE

## 2023-10-12 PROCEDURE — 1036F TOBACCO NON-USER: CPT | Performed by: FAMILY MEDICINE

## 2023-10-12 PROCEDURE — 1159F MED LIST DOCD IN RCRD: CPT | Performed by: FAMILY MEDICINE

## 2023-10-12 RX ORDER — OXYMETAZOLINE HYDROCHLORIDE 0.05 G/100ML
2 SPRAY, METERED NASAL 2 TIMES DAILY
Qty: 14.7 ML | Refills: 0 | Status: SHIPPED | OUTPATIENT
Start: 2023-10-12

## 2023-10-12 RX ORDER — CIPROFLOXACIN 500 MG/1
500 TABLET ORAL 2 TIMES DAILY
COMMUNITY
Start: 2023-10-03 | End: 2023-10-31 | Stop reason: ALTCHOICE

## 2023-10-12 RX ORDER — FLUTICASONE PROPIONATE 50 MCG
2 SPRAY, SUSPENSION (ML) NASAL DAILY
Qty: 16 G | Refills: 11 | Status: SHIPPED | OUTPATIENT
Start: 2023-10-12 | End: 2023-11-11

## 2023-10-12 NOTE — PATIENT INSTRUCTIONS
Call if your symptoms are not improving within a few days.      Ways to Help Prevent Falls at Home    Quick Tips   ? Ask for help if you need it. Most people want to help!   ? Get up slowly after sitting or laying down   ? Wear a medical alert device or keep cell phone in your pocket   ? Use night lights, especially areas near a bathroom   ? Keep the items you use often within reach on a small stool or end table   ? Use an assistive device such as walker or cane, as directed by provider/physical therapy   ? Use a non-slip mat and grab bars in your bathroom. Look for home health sections for best options     Other Areas to Focus On   ? Exercise and nutrition: Regular exercise or taking a falls prevention class are great ways improve strength and balance. Don’t forget to stay hydrated and bring a snack!   ? Medicine side effects: Some medicines can make you sleepy or dizzy, which could cause a fall. Ask your healthcare provider about the side effects your medicines could cause. Be sure to let them know if you take any vitamins or supplements as well.   ? Tripping hazards: Remove items you could trip on, such as loose mats, rugs, cords, and clutter. Wear closed toe shoes with rubber soles.   ? Health and wellness: Get regular checkups with your healthcare provider, plus routine vision and hearing screenings. Talk with your healthcare provider about:   o Your medicines and the possible side effects - bring them in a bag if that is easier!   o Problems with balance or feeling dizzy   o Ways to promote bone health, such as Vitamin D and calcium supplements   o Questions or concerns about falling     *Ask your healthcare team if you have questions     [facility], 2022

## 2023-10-12 NOTE — PROGRESS NOTES
Subjective   Patient ID: [id number]     Genesis Canela is a 78 y.o. female who presents for Nasal Congestion.    HPI  Has been having trouble sleeping due to nasal congestion and yellow nasal mucous with some blood; no pain in face  Patient defers on therapy to prevent falls  Review of Systems  General-denies weakness or myalgias; no unexplained fever or chills  OPTH-No itchy eyes, or blurry vision; has bilateral dry eyes,   ENT-dry mouth;  sneezing 3-4 times per day; no sore throat  Pulm-no cough  NECK-no stiffness, swelling or pain    Objective     /62 (BP Location: Left arm, Patient Position: Sitting)   Temp 36.6 °C (97.9 °F) (Oral)   Wt 70.3 kg (155 lb)   BMI 29.29 kg/m²      Physical Exam  Eyes-pupils equal round, reactive to light and accommodation, fundi with normal cup/disc ratio, conjunctiva without redness or discharge  General- well defined, well nourished, well hydrated individual in NAD  Skin- normal color and turgor; without nail pitting  Head-normocephalic without masses or tenderness  Ears-normal pinnae, and canals, with normal landmarks and light reflex of tympanic membranes bilaterally  Nose-septum in the midline, pale and congested mucosa bilaterally  Throat- without erythema or exudate, uvula in midlineNeck-supple without lymphadenopathy or thyromegaly; no carotid bruits  Heart- regular rate and rhythm, normal s1 and s2 without murmur or gallop  Lungs-clear to auscultation    Assessment/Plan     Problem List Items Addressed This Visit       Asthma, mild     Other Visit Diagnoses       Frontal sinusitis, unspecified chronicity    -  Primary    Relevant Medications    fluticasone (Flonase) 50 mcg/actuation nasal spray    oxymetazoline (Afrin, oxymetazoline,) 0.05 % nasal spray        Call if your symptoms are not improving within a few days.    Ramez Vásquez MD   Patient was identified as a fall risk. Risk prevention instructions provided.

## 2023-10-16 NOTE — PROGRESS NOTES
Subjective   Patient ID: Genesis Canela is a 78 y.o. female who has a hx of COPD, HTN, DM, CHF with EF of 20-25%, CKD, gout, GERD and asthma who presents for diverticulitis.    HPI    CT AP on 9/18/23  IMPRESSION:  Air in the urinary bladder. The bladder wall is markedly thickened.  Correlate clinically for instrumentation. Otherwise consideration  should be given to fistula to bowel.      Thickening of the wall of sigmoid colon with numerous diverticula.  Left colonic distention than on the prior exam.    Large duodenal diverticula.  Coronary artery calcifications.    Colonoscopy 2018    She has had a UTI since February and has been on many doses of antibiotics but nothing is helping.  She will have flecks of sediment in the urine.  She has burning with urination.  No c/o any hematuria.  No c/o any stool or air coming from the vagina.  She sees her urologist Dr. Patino.  She has abdominal pain in the LLQ off and on for months.  It is a sharp pain daily with her BM's and it eases after the BM's.  She will have diarrhea throughout the day.  She can have up to 10 small BM's daily.  No c/o any black or red stool.  She has a good appetite with no n/v.      She has lost over 25 pounds over the past year.    NO abdominal surgeries.  No family hx of colorectal cancer or IBD.    PAST MEDICAL HISTORY:  Past Medical History:  10/08/2018: Acute laryngitis      Comment:  Acute laryngitis  08/24/2021: Body mass index (BMI) 27.0-27.9, adult      Comment:  BMI 27.0-27.9,adult  02/22/2019: Body mass index (BMI) 31.0-31.9, adult      Comment:  BMI 31.0-31.9,adult  02/17/2022: Contusion of left front wall of thorax, subsequent   encounter      Comment:  Contusion of left chest wall, subsequent encounter  09/05/2017: Dorsalgia, unspecified      Comment:  Acute back pain  04/08/2022: Other symptoms and signs involving the genitourinary   system      Comment:  Abnormal urine color  No date: Personal history of other diseases of the  digestive system      Comment:  History of diverticulitis of colon  03/22/2018: Personal history of other endocrine, nutritional and   metabolic disease      Comment:  History of hyperkalemia  10/08/2018: Personal history of other infectious and parasitic   diseases      Comment:  History of viral gastroenteritis  12/04/2017: Personal history of other specified conditions      Comment:  History of epistaxis  11/13/2019: Personal history of other specified conditions      Comment:  History of diarrhea  12/03/2019: Personal history of other specified conditions      Comment:  History of abdominal pain  01/16/2018: Personal history of other venous thrombosis and embolism      Comment:  History of deep venous thrombosis  12/05/2022: Personal history of urinary (tract) infections      Comment:  History of urinary tract infection    PAST SURGICAL HISTORY:  Past Surgical History:  01/27/2019: OTHER SURGICAL HISTORY      Comment:  Knee arthroscopy    SOCIAL HISTORY:  Social History     Socioeconomic History    Marital status: Single     Spouse name: Not on file    Number of children: Not on file    Years of education: Not on file    Highest education level: Not on file   Occupational History    Not on file   Tobacco Use    Smoking status: Never    Smokeless tobacco: Never   Substance and Sexual Activity    Alcohol use: Not Currently    Drug use: Not on file    Sexual activity: Not on file   Other Topics Concern    Not on file   Social History Narrative    Not on file     Social Determinants of Health     Financial Resource Strain: Not on file   Food Insecurity: Not on file   Transportation Needs: Not on file   Physical Activity: Not on file   Stress: Not on file   Social Connections: Not on file   Intimate Partner Violence: Not on file   Housing Stability: Not on file        FAMILY HISTORY:  Family History   Problem Relation Name Age of Onset    Breast cancer Mother      Clotting disorder Father      Diabetes Father       "Leukemia Brother         MEDICATIONS:  Current Outpatient Medications   Medication Sig Dispense Refill    alendronate (Fosamax) 70 mg tablet Take 1 tablet (70 mg) by mouth 1 (one) time per week. 12 tablet 1    allopurinol (Zyloprim) 100 mg tablet Take 1 tablet (100 mg) by mouth once daily.      aspirin 81 mg EC tablet Take 1 tablet (81 mg) by mouth once daily.      Bifidobacterium infantis (Align) 4 mg capsule Use as directed      ciprofloxacin (Cipro) 500 mg tablet Take 1 tablet (500 mg) by mouth 2 times a day.      ergocalciferol, vitamin D2, 50 mcg (2,000 unit) tablet Take 1 tablet by mouth once daily.      ezetimibe (Zetia) 10 mg tablet Take 1 tablet (10 mg) by mouth once daily.      fluticasone (Flonase) 50 mcg/actuation nasal spray Administer 2 sprays into each nostril once daily. Shake gently. Before first use, prime pump. After use, clean tip and replace cap. Use this for two weeks after symptoms appear even though you should feel better within a few days of allergen exposure. 16 g 11    insulin glargine (Lantus) 100 unit/mL (3 mL) pen Inject 60-70 units before breakfast and 60 units at bedtime      magnesium chloride 64 mg magnesium tablet Take 1 tablet by mouth 2 times a day.      metFORMIN (Glucophage) 500 mg tablet Take 2 tablets every morning and 1 tablet every night      metoprolol succinate XL (Toprol-XL) 50 mg 24 hr tablet Take 1 tablet (50 mg) by mouth once daily.      montelukast (Singulair) 10 mg tablet Take 1 tablet (10 mg) by mouth once daily in the evening.      oxymetazoline (Afrin, oxymetazoline,) 0.05 % nasal spray Administer 2 sprays into each nostril 2 times a day. For three days only 14.7 mL 0    pen needle, diabetic 32 gauge x 5/32\" needle       sacubitriL-valsartan (Entresto) 49-51 mg tablet Take 1 tablet by mouth 2 times a day.      simvastatin (Zocor) 40 mg tablet Take 1 tablet (40 mg) by mouth once daily at bedtime.      SITagliptin phosphate (Januvia) 100 mg tablet Take 1 tablet " (100 mg) by mouth once daily.      torsemide (Demadex) 20 mg tablet Take 2 tablets (40 mg) by mouth once daily.       No current facility-administered medications for this visit.         Review of Systems   Cardiovascular:  Positive for leg swelling.   Gastrointestinal:  Positive for abdominal pain and diarrhea.   All other systems reviewed and are negative.      Objective   Physical Exam  Constitutional:       Appearance: Normal appearance.   HENT:      Head: Normocephalic.   Cardiovascular:      Rate and Rhythm: Normal rate and regular rhythm.   Pulmonary:      Effort: Pulmonary effort is normal.      Breath sounds: Normal breath sounds.   Abdominal:      General: Abdomen is flat. Bowel sounds are normal.      Palpations: Abdomen is soft.      Tenderness: There is abdominal tenderness (tenderness in the LLQ).      Hernia: A hernia (umbilical hernia with discomfort) is present.   Musculoskeletal:         General: Swelling (taina. lower leg edema) present. Normal range of motion.      Cervical back: Normal range of motion and neck supple.   Skin:     General: Skin is warm and dry.   Neurological:      General: No focal deficit present.      Mental Status: She is alert and oriented to person, place, and time.   Psychiatric:         Mood and Affect: Mood normal.         Behavior: Behavior normal.       STUDY:  CT ABDOMEN PELVIS W IV CONTRAST;  10/2/2023 1:21 pm      INDICATION:  Signs/Symptoms:RECURRENT UTI.      COMPARISON:  05/05/2023      ACCESSION NUMBER(S):  [id number]      ORDERING CLINICIAN:  AILIN AMARO      TECHNIQUE:  CT of the abdomen and pelvis was performed following the intravenous  administration 75 mL Omnipaque 350. Sagittal and coronal  reconstructions were generated.      FINDINGS:  LOWER CHEST:  There is interstitial lung disease. There are coronary artery  calcifications.      ABDOMEN:      LIVER:  Unremarkable      BILE DUCTS:  Unremarkable      GALLBLADDER:  There are no obvious small stones.       PANCREAS:  Pancreas is atrophic.      SPLEEN:  Unremarkable      ADRENAL GLANDS:  There are no adrenal masses.      KIDNEYS AND URETERS:  The kidneys are not obstructed. There appears to be some thickening  and enhancement of the wall of the right renal pelvis.      There are renal cortical cysts. There are no obvious renal calculi.      The ureters are normal in caliber.      PELVIS:      BLADDER:  The bladder wall is thickened. There is air in the urinary bladder.  There is stranding of the adjacent fat      REPRODUCTIVE ORGANS:  Uterus is identified. There is a suggestion of left ovarian follicles.      BOWEL:  There appear to be 3 large duodenal diverticula.      There is otherwise no significant bowel distention. There are  numerous colonic diverticula.      There is thickening of the wall of the sigmoid colon. This may be due  to chronic diverticular disease. Acute diverticulitis cannot be  excluded.      VESSELS:  There are atherosclerotic changes of the aorta. The cava is  unremarkable.      PERITONEUM/RETROPERITONEUM/LYMPH NODES:  There is no free air or free fluid.      BONES AND ABDOMINAL WALL:  The patient is scoliotic. There are degenerative changes of the  spine. The patient appears to be status post vertebroplasty.      COMPARISON OF FINDINGS:  The air was not present in the urinary bladder previously. There is  less distention of the sigmoid colon than there was previously.      IMPRESSION:  Air in the urinary bladder. The bladder wall is markedly thickened.  Correlate clinically for instrumentation. Otherwise consideration  should be given to fistula to bowel.      Thickening of the wall of sigmoid colon with numerous diverticula.  Left colonic distention than on the prior exam.      Large duodenal diverticula.      Coronary artery calcifications.        Assessment/Plan   #Air in bladder  #Sigmoid colon thickening  #Recurrent UTIs  -  CT imaging reviewed  -  While there is air in the bladder and  thickening of the adjacent colon, I do not appreciate a definitive colovesical fistula  -  Recommend patient perform colonoscopy   -  She is already following with urology, Dr. Dalton  -  Follow-up with me after completion of colonoscopy  -  Discussed with patient that given her significant co-morbidities, if there is indeed a colovesical fistula, likely safest option would be for Paulo's procedure    #Chronic heart failure, EF 20-25%  #Insulin dependent DM  #COPD  #Overweight    Kenroy Maradiaga MD   10/17/2023

## 2023-10-17 ENCOUNTER — OFFICE VISIT (OUTPATIENT)
Dept: SURGERY | Facility: CLINIC | Age: 78
End: 2023-10-17
Payer: MEDICARE

## 2023-10-17 VITALS
SYSTOLIC BLOOD PRESSURE: 147 MMHG | BODY MASS INDEX: 29.29 KG/M2 | DIASTOLIC BLOOD PRESSURE: 80 MMHG | HEART RATE: 82 BPM | WEIGHT: 155 LBS

## 2023-10-17 DIAGNOSIS — R93.41 ABNORMAL CT SCAN, BLADDER: Primary | ICD-10-CM

## 2023-10-17 DIAGNOSIS — K63.9 MURAL THICKENING OF SIGMOID COLON: ICD-10-CM

## 2023-10-17 DIAGNOSIS — K57.90 DIVERTICULOSIS: ICD-10-CM

## 2023-10-17 DIAGNOSIS — I50.9 CHRONIC HEART FAILURE, UNSPECIFIED HEART FAILURE TYPE (MULTI): ICD-10-CM

## 2023-10-17 DIAGNOSIS — E66.3 OVERWEIGHT (BMI 25.0-29.9): ICD-10-CM

## 2023-10-17 DIAGNOSIS — J44.9 CHRONIC OBSTRUCTIVE PULMONARY DISEASE, UNSPECIFIED COPD TYPE (MULTI): ICD-10-CM

## 2023-10-17 DIAGNOSIS — N39.0 RECURRENT UTI: ICD-10-CM

## 2023-10-17 PROCEDURE — 99204 OFFICE O/P NEW MOD 45 MIN: CPT | Performed by: STUDENT IN AN ORGANIZED HEALTH CARE EDUCATION/TRAINING PROGRAM

## 2023-10-17 PROCEDURE — 1126F AMNT PAIN NOTED NONE PRSNT: CPT | Performed by: STUDENT IN AN ORGANIZED HEALTH CARE EDUCATION/TRAINING PROGRAM

## 2023-10-17 PROCEDURE — 1159F MED LIST DOCD IN RCRD: CPT | Performed by: STUDENT IN AN ORGANIZED HEALTH CARE EDUCATION/TRAINING PROGRAM

## 2023-10-17 PROCEDURE — 1036F TOBACCO NON-USER: CPT | Performed by: STUDENT IN AN ORGANIZED HEALTH CARE EDUCATION/TRAINING PROGRAM

## 2023-10-17 PROCEDURE — 1160F RVW MEDS BY RX/DR IN RCRD: CPT | Performed by: STUDENT IN AN ORGANIZED HEALTH CARE EDUCATION/TRAINING PROGRAM

## 2023-10-17 PROCEDURE — 3079F DIAST BP 80-89 MM HG: CPT | Performed by: STUDENT IN AN ORGANIZED HEALTH CARE EDUCATION/TRAINING PROGRAM

## 2023-10-17 PROCEDURE — 3077F SYST BP >= 140 MM HG: CPT | Performed by: STUDENT IN AN ORGANIZED HEALTH CARE EDUCATION/TRAINING PROGRAM

## 2023-10-17 ASSESSMENT — ENCOUNTER SYMPTOMS
DIARRHEA: 1
ABDOMINAL PAIN: 1

## 2023-10-28 ENCOUNTER — HOSPITAL ENCOUNTER (EMERGENCY)
Facility: HOSPITAL | Age: 78
Discharge: HOME | End: 2023-10-28
Attending: EMERGENCY MEDICINE
Payer: MEDICARE

## 2023-10-28 VITALS
OXYGEN SATURATION: 97 % | TEMPERATURE: 96.8 F | DIASTOLIC BLOOD PRESSURE: 76 MMHG | BODY MASS INDEX: 29.1 KG/M2 | RESPIRATION RATE: 18 BRPM | HEART RATE: 78 BPM | WEIGHT: 154 LBS | SYSTOLIC BLOOD PRESSURE: 157 MMHG

## 2023-10-28 DIAGNOSIS — S81.819S LACERATION OF LOWER EXTREMITY, UNSPECIFIED LATERALITY, SEQUELA: Primary | ICD-10-CM

## 2023-10-28 PROCEDURE — 94760 N-INVAS EAR/PLS OXIMETRY 1: CPT

## 2023-10-28 PROCEDURE — 12004 RPR S/N/AX/GEN/TRK7.6-12.5CM: CPT | Performed by: EMERGENCY MEDICINE

## 2023-10-28 PROCEDURE — 99284 EMERGENCY DEPT VISIT MOD MDM: CPT | Performed by: EMERGENCY MEDICINE

## 2023-10-28 PROCEDURE — 99283 EMERGENCY DEPT VISIT LOW MDM: CPT | Performed by: EMERGENCY MEDICINE

## 2023-10-28 PROCEDURE — 2500000005 HC RX 250 GENERAL PHARMACY W/O HCPCS: Performed by: STUDENT IN AN ORGANIZED HEALTH CARE EDUCATION/TRAINING PROGRAM

## 2023-10-28 RX ORDER — LIDOCAINE HYDROCHLORIDE 10 MG/ML
20 INJECTION, SOLUTION EPIDURAL; INFILTRATION; INTRACAUDAL; PERINEURAL ONCE
Status: COMPLETED | OUTPATIENT
Start: 2023-10-28 | End: 2023-10-28

## 2023-10-28 RX ORDER — LIDOCAINE HYDROCHLORIDE 10 MG/ML
INJECTION, SOLUTION EPIDURAL; INFILTRATION; INTRACAUDAL; PERINEURAL
Status: DISCONTINUED
Start: 2023-10-28 | End: 2023-10-28 | Stop reason: HOSPADM

## 2023-10-28 RX ADMIN — LIDOCAINE HYDROCHLORIDE 200 MG: 10 INJECTION, SOLUTION EPIDURAL; INFILTRATION; INTRACAUDAL; PERINEURAL at 18:42

## 2023-10-28 ASSESSMENT — PAIN DESCRIPTION - FREQUENCY: FREQUENCY: CONSTANT/CONTINUOUS

## 2023-10-28 ASSESSMENT — COLUMBIA-SUICIDE SEVERITY RATING SCALE - C-SSRS
6. HAVE YOU EVER DONE ANYTHING, STARTED TO DO ANYTHING, OR PREPARED TO DO ANYTHING TO END YOUR LIFE?: NO
2. HAVE YOU ACTUALLY HAD ANY THOUGHTS OF KILLING YOURSELF?: NO
1. IN THE PAST MONTH, HAVE YOU WISHED YOU WERE DEAD OR WISHED YOU COULD GO TO SLEEP AND NOT WAKE UP?: NO

## 2023-10-28 ASSESSMENT — PAIN DESCRIPTION - DESCRIPTORS: DESCRIPTORS: ACHING

## 2023-10-28 ASSESSMENT — PAIN - FUNCTIONAL ASSESSMENT: PAIN_FUNCTIONAL_ASSESSMENT: 0-10

## 2023-10-28 ASSESSMENT — PAIN SCALES - GENERAL: PAINLEVEL_OUTOF10: 5 - MODERATE PAIN

## 2023-10-28 ASSESSMENT — PAIN DESCRIPTION - PROGRESSION: CLINICAL_PROGRESSION: NOT CHANGED

## 2023-10-28 ASSESSMENT — LIFESTYLE VARIABLES
EVER HAD A DRINK FIRST THING IN THE MORNING TO STEADY YOUR NERVES TO GET RID OF A HANGOVER: NO
REASON UNABLE TO ASSESS: NO
HAVE PEOPLE ANNOYED YOU BY CRITICIZING YOUR DRINKING: NO
EVER FELT BAD OR GUILTY ABOUT YOUR DRINKING: NO
HAVE YOU EVER FELT YOU SHOULD CUT DOWN ON YOUR DRINKING: NO

## 2023-10-28 ASSESSMENT — PAIN DESCRIPTION - ORIENTATION: ORIENTATION: LEFT

## 2023-10-28 ASSESSMENT — PAIN DESCRIPTION - ONSET: ONSET: ONGOING

## 2023-10-28 ASSESSMENT — PAIN DESCRIPTION - LOCATION: LOCATION: LEG

## 2023-10-28 ASSESSMENT — PAIN DESCRIPTION - PAIN TYPE: TYPE: ACUTE PAIN

## 2023-10-28 NOTE — ED PROVIDER NOTES
HPI   Chief Complaint   Patient presents with    Laceration       This is an 86-year-old female, with past medical history of CKD 3, diabetes, hypertension who presents to the ED after she cut her right leg posteriorly on a plastic box.  She noted that there was a significant mount of bleeding, this is why she presented to the ED.  She denies any subsequent injury or fall, and reports pain from the posterior calf.                              [place] Coma Scale Score: 15                  Patient History   Past Medical History:   Diagnosis Date    Acute laryngitis 10/08/2018    Acute laryngitis    Body mass index (BMI) 27.0-27.9, adult 08/24/2021    BMI 27.0-27.9,adult    Body mass index (BMI) 31.0-31.9, adult 02/22/2019    BMI 31.0-31.9,adult    Contusion of left front wall of thorax, subsequent encounter 02/17/2022    Contusion of left chest wall, subsequent encounter    Dorsalgia, unspecified 09/05/2017    Acute back pain    Other symptoms and signs involving the genitourinary system 04/08/2022    Abnormal urine color    Personal history of other diseases of the digestive system     History of diverticulitis of colon    Personal history of other endocrine, nutritional and metabolic disease 03/22/2018    History of hyperkalemia    Personal history of other infectious and parasitic diseases 10/08/2018    History of viral gastroenteritis    Personal history of other specified conditions 12/04/2017    History of epistaxis    Personal history of other specified conditions 11/13/2019    History of diarrhea    Personal history of other specified conditions 12/03/2019    History of abdominal pain    Personal history of other venous thrombosis and embolism 01/16/2018    History of deep venous thrombosis    Personal history of urinary (tract) infections 12/05/2022    History of urinary tract infection     Past Surgical History:   Procedure Laterality Date    OTHER SURGICAL HISTORY  01/27/2019    Knee arthroscopy     Family  History   Problem Relation Name Age of Onset    Breast cancer Mother      Clotting disorder Father      Diabetes Father      Leukemia Brother       Social History     Tobacco Use    Smoking status: Never    Smokeless tobacco: Never   Substance Use Topics    Alcohol use: Not Currently    Drug use: Not on file       Physical Exam   ED Triage Vitals [10/28/23 1802]   Temp Heart Rate Resp BP   36.6 °C (97.9 °F) 94 18 168/89      SpO2 Temp Source Heart Rate Source Patient Position   98 % Temporal Monitor Sitting      BP Location FiO2 (%)     Right arm --       Physical Exam  Constitutional:       Appearance: Normal appearance.   HENT:      Head: Normocephalic and atraumatic.      Mouth/Throat:      Mouth: Mucous membranes are moist.   Eyes:      Extraocular Movements: Extraocular movements intact.   Cardiovascular:      Rate and Rhythm: Normal rate and regular rhythm.      Heart sounds: Normal heart sounds. No murmur heard.  Pulmonary:      Effort: Pulmonary effort is normal. No respiratory distress.      Breath sounds: Normal breath sounds. No wheezing.   Abdominal:      General: There is no distension.      Palpations: Abdomen is soft.      Tenderness: There is no abdominal tenderness. There is no guarding.   Musculoskeletal:      Right lower leg: Edema present.      Left lower leg: Edema present.      Comments: There is a large laceration on the posterior aspect of the right calf, crescent-shaped, not actively bleeding.   Skin:     General: Skin is warm and dry.   Neurological:      General: No focal deficit present.      Mental Status: She is alert and oriented to person, place, and time.   Psychiatric:         Mood and Affect: Mood normal.         Behavior: Behavior normal.         ED Course & MDM        Medical Decision Making  Patient's tetanus was updated several months ago.  She presents for laceration is hemodynamically stable did not experience any symptoms prior to this.  Plan to washout area, and repaired  with sutures and she will be discharged home with outpatient PCP follow-up, she already has PCP schedule follow-up    Patient tolerated laceration repair well, we discharged home discussed with the attending  Brendon Warner DO PGY-4  Emergency Medicine                Procedure  Laceration Repair    Performed by: Brendon Warenr DO  Authorized by: Enrique Ramos DO    Consent:     Consent obtained:  Verbal    Consent given by:  Patient    Risks discussed:  Infection and need for additional repair  Laceration details:     Location:  Leg    Leg location:  R lower leg    Length (cm):  9  Treatment:     Area cleansed with:  Povidone-iodine    Amount of cleaning:  Extensive    Irrigation solution:  Sterile saline    Debridement:  None  Skin repair:     Repair method:  Sutures    Suture size:  3-0    Suture material:  Nylon    Suture technique:  Simple interrupted    Number of sutures:  12  Approximation:     Approximation:  Loose       Brendon Warner DO  Resident  10/28/23 1945    The patient was seen by the resident/fellow.  I have personally performed a substantive portion of the encounter.  I have seen and examined the patient; agree with the workup, evaluation, MDM, management and diagnosis.  The care plan has been discussed with the resident/fellow; I have reviewed the resident/fellow’s note and agree with the documented findings with the exception/addition of the following:    The patient was instructed to return to the emergency room for any signs of infection including increased redness pain or swelling, otherwise she is to have the sutures evaluated for removal in 12 to 14 days.       Enrique Ramos DO  10/29/23 2059

## 2023-10-28 NOTE — DISCHARGE INSTRUCTIONS
Please follow-up with your primary care physician, please have the sutures removed in 10 to 14 days.  Return to the emergency department if you develop any significant swelling or redness around the area.  Or if you develop any uncontrolled nausea, vomiting

## 2023-10-28 NOTE — ED TRIAGE NOTES
Patient cut her leg on plastic container at home dressing in place and bleeding controlled. Pt is on Asprin

## 2023-10-29 NOTE — ED NOTES
Telfa, abd and kerlix wrap applied to RLE as sutured area is seeping; patient given discharge instructions and stated that she understands.     Stephanie Landin RN  10/28/23 2018

## 2023-10-30 ENCOUNTER — HOSPITAL ENCOUNTER (EMERGENCY)
Facility: HOSPITAL | Age: 78
Discharge: HOME | End: 2023-10-30
Attending: EMERGENCY MEDICINE
Payer: MEDICARE

## 2023-10-30 VITALS
BODY MASS INDEX: 29.07 KG/M2 | OXYGEN SATURATION: 97 % | WEIGHT: 154 LBS | HEIGHT: 61 IN | TEMPERATURE: 98.6 F | RESPIRATION RATE: 18 BRPM | DIASTOLIC BLOOD PRESSURE: 70 MMHG | SYSTOLIC BLOOD PRESSURE: 140 MMHG | HEART RATE: 76 BPM

## 2023-10-30 DIAGNOSIS — L08.9 WOUND INFECTION: Primary | ICD-10-CM

## 2023-10-30 DIAGNOSIS — T14.8XXA WOUND INFECTION: Primary | ICD-10-CM

## 2023-10-30 PROCEDURE — 99283 EMERGENCY DEPT VISIT LOW MDM: CPT | Performed by: EMERGENCY MEDICINE

## 2023-10-30 PROCEDURE — 94760 N-INVAS EAR/PLS OXIMETRY 1: CPT

## 2023-10-30 PROCEDURE — 99283 EMERGENCY DEPT VISIT LOW MDM: CPT | Mod: 25

## 2023-10-30 PROCEDURE — 99283 EMERGENCY DEPT VISIT LOW MDM: CPT | Mod: 25 | Performed by: EMERGENCY MEDICINE

## 2023-10-30 PROCEDURE — 2500000001 HC RX 250 WO HCPCS SELF ADMINISTERED DRUGS (ALT 637 FOR MEDICARE OP)

## 2023-10-30 RX ORDER — CEPHALEXIN 500 MG/1
500 CAPSULE ORAL 3 TIMES DAILY
Qty: 30 CAPSULE | Refills: 0 | Status: SHIPPED | OUTPATIENT
Start: 2023-10-30 | End: 2023-11-09

## 2023-10-30 RX ORDER — CEPHALEXIN 500 MG/1
500 CAPSULE ORAL ONCE
Status: COMPLETED | OUTPATIENT
Start: 2023-10-30 | End: 2023-10-30

## 2023-10-30 RX ADMIN — CEPHALEXIN 500 MG: 500 CAPSULE ORAL at 18:38

## 2023-10-30 ASSESSMENT — LIFESTYLE VARIABLES
REASON UNABLE TO ASSESS: NO
HAVE YOU EVER FELT YOU SHOULD CUT DOWN ON YOUR DRINKING: NO
EVER HAD A DRINK FIRST THING IN THE MORNING TO STEADY YOUR NERVES TO GET RID OF A HANGOVER: NO
EVER FELT BAD OR GUILTY ABOUT YOUR DRINKING: NO
HAVE PEOPLE ANNOYED YOU BY CRITICIZING YOUR DRINKING: NO

## 2023-10-30 ASSESSMENT — COLUMBIA-SUICIDE SEVERITY RATING SCALE - C-SSRS
1. IN THE PAST MONTH, HAVE YOU WISHED YOU WERE DEAD OR WISHED YOU COULD GO TO SLEEP AND NOT WAKE UP?: NO
2. HAVE YOU ACTUALLY HAD ANY THOUGHTS OF KILLING YOURSELF?: NO
6. HAVE YOU EVER DONE ANYTHING, STARTED TO DO ANYTHING, OR PREPARED TO DO ANYTHING TO END YOUR LIFE?: NO

## 2023-10-30 ASSESSMENT — PAIN SCALES - GENERAL: PAINLEVEL_OUTOF10: 0 - NO PAIN

## 2023-10-30 ASSESSMENT — PAIN DESCRIPTION - PROGRESSION: CLINICAL_PROGRESSION: NOT CHANGED

## 2023-10-30 ASSESSMENT — PAIN - FUNCTIONAL ASSESSMENT: PAIN_FUNCTIONAL_ASSESSMENT: 0-10

## 2023-10-30 NOTE — DISCHARGE INSTRUCTIONS
Follow up with the wound care clinic tomorrow and return to the ER for any bleeding, signs of infection, pain, persistent or worsening symptoms.

## 2023-10-31 ENCOUNTER — OFFICE VISIT (OUTPATIENT)
Dept: PRIMARY CARE | Facility: CLINIC | Age: 78
End: 2023-10-31
Payer: MEDICARE

## 2023-10-31 VITALS
SYSTOLIC BLOOD PRESSURE: 128 MMHG | BODY MASS INDEX: 29.66 KG/M2 | DIASTOLIC BLOOD PRESSURE: 72 MMHG | TEMPERATURE: 98.2 F | WEIGHT: 157 LBS

## 2023-10-31 DIAGNOSIS — S81.811A LACERATION OF RIGHT LOWER EXTREMITY, INITIAL ENCOUNTER: Primary | ICD-10-CM

## 2023-10-31 DIAGNOSIS — K42.9 UMBILICAL HERNIA WITHOUT OBSTRUCTION AND WITHOUT GANGRENE: ICD-10-CM

## 2023-10-31 PROBLEM — S81.819A LEG LACERATION: Status: ACTIVE | Noted: 2023-10-31

## 2023-10-31 PROCEDURE — 3078F DIAST BP <80 MM HG: CPT | Performed by: FAMILY MEDICINE

## 2023-10-31 PROCEDURE — 1126F AMNT PAIN NOTED NONE PRSNT: CPT | Performed by: FAMILY MEDICINE

## 2023-10-31 PROCEDURE — 3074F SYST BP LT 130 MM HG: CPT | Performed by: FAMILY MEDICINE

## 2023-10-31 PROCEDURE — 1159F MED LIST DOCD IN RCRD: CPT | Performed by: FAMILY MEDICINE

## 2023-10-31 PROCEDURE — 1160F RVW MEDS BY RX/DR IN RCRD: CPT | Performed by: FAMILY MEDICINE

## 2023-10-31 PROCEDURE — 99213 OFFICE O/P EST LOW 20 MIN: CPT | Performed by: FAMILY MEDICINE

## 2023-10-31 PROCEDURE — 1036F TOBACCO NON-USER: CPT | Performed by: FAMILY MEDICINE

## 2023-10-31 NOTE — PROGRESS NOTES
Subjective   Patient ID: [id number]     Genesis Canela is a 78 y.o. female who presents for No chief complaint on file..    HPI  Went to Emergency two days ago after tripping into the edge of a plastic bucket lacerating her right lower lateral leg;  she returned to emergency yesterday because she was afraid it was getting infected and she was started on keflex;  she will be seen at the wound center tomorrow;  minimal pain when she touches it or walks;  she is cleaning it twice daily    Review of Systems  ID-no fever  MSK-No locking, giving way/swelling of joints  NEURO- No fall  GI- still with soft stools and possible air in her bladder that may require possible partial bowel resection    Objective     /72 (BP Location: Left arm, Patient Position: Sitting)   Temp 36.8 °C (98.2 °F) (Oral)   Wt 71.2 kg (157 lb)   BMI 29.66 kg/m²      Physical Exam  Heart- regular rate and rhythm, normal s1 and s2 without murmur or gallop  Lungs-clear to auscultation  Abdomen-soft, positive bowel sounds, without masses, HSmegaly or pain;  umbilical hernia    Assessment/Plan     Problem List Items Addressed This Visit       Umbilical hernia    Laceration of right lower extremity - Primary     Continue to follow up with Dr Adorno for your Diabetes and Dr Florian for your chronic diarrhea.    Dr Maradiaga should assess your umbilical hernia and the Wound Center will address your laceration.    Follow up fasting (no alcohol for 48 hours and just water for 14 hours) in three months for your next routine appointment.  In general, take any medications on schedule (except for types of Insulin).      Ramez Vásquez MD

## 2023-10-31 NOTE — PATIENT INSTRUCTIONS
Continue to follow up with Dr Adorno for your Diabetes and Dr Florian for your chronic diarrhea.    Dr Maradiaga should assess your umbilical hernia and the Wound Center will address your laceration.    Follow up fasting (no alcohol for 48 hours and just water for 14 hours) in three months for your next routine appointment.  In general, take any medications on schedule (except for types of Insulin).

## 2023-11-01 ENCOUNTER — OFFICE VISIT (OUTPATIENT)
Dept: WOUND CARE | Facility: CLINIC | Age: 78
End: 2023-11-01
Payer: MEDICARE

## 2023-11-01 PROCEDURE — 99214 OFFICE O/P EST MOD 30 MIN: CPT

## 2023-11-01 NOTE — ED PROVIDER NOTES
HPI   Chief Complaint   Patient presents with    Wound Check       HPI     The patient is an 78 year old female  past medical history of CKD 3, diabetes, hypertension arriving for a wound check after sutures were placed on 10/28.  She denies any new injury or fall, and reports pain from the posterior calf, Right sided. Her wound appears clinically infected.       Allergies   Allergen Reactions    Colchicine Diarrhea               No data recorded                Patient History   Past Medical History:   Diagnosis Date    Acute laryngitis 10/08/2018    Acute laryngitis    Body mass index (BMI) 27.0-27.9, adult 08/24/2021    BMI 27.0-27.9,adult    Body mass index (BMI) 31.0-31.9, adult 02/22/2019    BMI 31.0-31.9,adult    Contusion of left front wall of thorax, subsequent encounter 02/17/2022    Contusion of left chest wall, subsequent encounter    Dorsalgia, unspecified 09/05/2017    Acute back pain    Other symptoms and signs involving the genitourinary system 04/08/2022    Abnormal urine color    Personal history of other diseases of the digestive system     History of diverticulitis of colon    Personal history of other endocrine, nutritional and metabolic disease 03/22/2018    History of hyperkalemia    Personal history of other infectious and parasitic diseases 10/08/2018    History of viral gastroenteritis    Personal history of other specified conditions 12/04/2017    History of epistaxis    Personal history of other specified conditions 11/13/2019    History of diarrhea    Personal history of other specified conditions 12/03/2019    History of abdominal pain    Personal history of other venous thrombosis and embolism 01/16/2018    History of deep venous thrombosis    Personal history of urinary (tract) infections 12/05/2022    History of urinary tract infection     Past Surgical History:   Procedure Laterality Date    OTHER SURGICAL HISTORY  01/27/2019    Knee arthroscopy     Family History   Problem Relation  Name Age of Onset    Breast cancer Mother      Clotting disorder Father      Diabetes Father      Leukemia Brother       Social History     Tobacco Use    Smoking status: Never    Smokeless tobacco: Never   Substance Use Topics    Alcohol use: Not Currently    Drug use: Not on file       Physical Exam   ED Triage Vitals   Temp Heart Rate Resp BP   10/30/23 1254 10/30/23 1254 10/30/23 1254 10/30/23 1254   36.8 °C (98.2 °F) 78 18 144/65      SpO2 Temp Source Heart Rate Source Patient Position   10/30/23 1254 10/30/23 1839 10/30/23 1254 10/30/23 1254   99 % Temporal Monitor Sitting      BP Location FiO2 (%)     10/30/23 1254 --     Right arm        Physical Exam  General: Alert, no acute distress, well developed, Well hydrated  Head: NCAT  Eyes: Clear conjunctiva, EOMI  ENT: Moist mucosa, no lymphadenopathy  Respiratory: Normal respiratory effort. CTAB. Symmetric aeration. No wheezes, rales, or Rhonchi.  CV: Normal rhythm, Normal Rate, pulses present bilaterally  GI: Soft, NT, no guarding, BS normoactive, No distention, No hernia, No palpable mass.  : no flank tenderness, no cva tenderness  MSK: Atraumatic. Full ROM in extremities. Bilateral symmetric intact strength. No pedal edema.  Skin: Warm, c/d/I, sutured laceration in a flap shape, P. R calf actively bleeding  Neuro: AOx3, facial symmetry,   sensorimotor intact in extremities,   CN intact, Nonfocal neurological exam    ED Course & MDM   Diagnoses as of 11/01/23 1603   Wound infection       Medical Decision Making    The patient's wound appeared clinically infected. The area was washed out.  tetanus was updated several months ago.  She is hemodynamically stable. The flap site appears clinically infected at the superior portion. Ace wrap was provided as well. She was given cephalexin for this here as well as a cephalexin Rx. Patient was told to return to the er for any persistent or worsening symptoms.      External Records Reviewed: I reviewed recent and  relevant outside records including: none  Independent Interpretation of Studies: I independently interpreted: As above  Social Determinants Affecting Care: Diagnostic testing considered: As above    I reviewed the case with the attending ER physician. Patient and/or patient´s representative was counseled regarding labs, imaging, likely diagnosis, and plan.     Radha Dickey MD MS  PGY-1, Emergency Medicine    The above documentation was completed with the use of speech recognition software. It may contain dictation errors secondary to limitations of the software.        Radha Dickey MD  Resident  11/01/23 3282    The patient was seen by the resident/fellow.  I have personally performed a substantive portion of the encounter.  I have seen and examined the patient; agree with the workup, evaluation, MDM, management and diagnosis.  The care plan has been discussed with the resident/fellow; I have reviewed the resident/fellow’s note and agree with the documented findings with the exception/addition of the following:    I had seen the patient previously on her visit for the laceration initially when she obtained it from hitting her calf against one of her plastic boxes.  At that time we flushed it thoroughly, and now there appears to be a hematoma under the wound, with tenderness at the area of the proximal flap.  Notify the patient is an area that can get infected when we have these flap issues which is why they were irrigated so much initial visit.  We will place her on antibiotics, and continue to watch for any signs of infection.  The overlying skin may be devitalized and I notified her this would stay on is a Bioclusive patch if it is devitalized but she is again to return to the emergency room for any increasing pain or redness, drainage from the wound, fevers or chills or any worsening symptoms otherwise take the antibiotics as directed and follow with her primary care doctor.  The sutures should be  evaluated removal in 12 to 14 days.       Enrique Ramos, DO  11/05/23 1810

## 2023-11-03 DIAGNOSIS — J84.10 PULMONARY FIBROSIS (MULTI): Primary | ICD-10-CM

## 2023-11-08 ENCOUNTER — OFFICE VISIT (OUTPATIENT)
Dept: WOUND CARE | Facility: CLINIC | Age: 78
End: 2023-11-08
Payer: MEDICARE

## 2023-11-08 PROCEDURE — 99212 OFFICE O/P EST SF 10 MIN: CPT

## 2023-11-09 ENCOUNTER — APPOINTMENT (OUTPATIENT)
Dept: PRIMARY CARE | Facility: CLINIC | Age: 78
End: 2023-11-09
Payer: MEDICARE

## 2023-11-15 ENCOUNTER — OFFICE VISIT (OUTPATIENT)
Dept: WOUND CARE | Facility: CLINIC | Age: 78
End: 2023-11-15
Payer: MEDICARE

## 2023-11-15 PROCEDURE — 11042 DBRDMT SUBQ TIS 1ST 20SQCM/<: CPT

## 2023-11-15 NOTE — PROGRESS NOTES
"HPI    Genesis Canela is a 78 y.o. female who who has a hx of COPD, HTN, DM, CHF with EF of 20-25%, CKD, gout, GERD and asthma who was last seen in clinic 10/17/2023 for air in bladder, sigmoid colon thickening and recurrent UTIs.  Patient was recommended colonoscopy.  She now returns S/P recent colonoscopy with Dr. Florian on 11/13/23. She was found to have a stricture in the distal sigmoid colon with severe diverticulosis in the sigmoid colon.  She presents today with her sister.  Patient reports that she is miserable.  Reports continued LLQ abdominal pain, frequent Bms.  States that her days revolve around going to the bathroom.  She was referred to [facility] colorectal surgeon by Dr. Florian.  No other changes or new complaints.      CT a/p 10/2/23: Air in the urinary bladder. The bladder wall is markedly thickened. Correlate clinically for instrumentation. Otherwise consideration should be given to fistula to bowel. Thickening of the wall of sigmoid colon with numerous diverticula. Left colonic distention than on the prior exam. Large duodenal diverticula. Coronary artery calcifications.    Colonoscopy 11/13/23 ([facility]): [facility] could not be advanced beyond 20 cm. The scope was changed to ultrathin \"bably\" colonoscope. Stricture in the distal sigmoid colon. Severe diverticulosis in the sigmoid colon. There was no evidence of diverticular bleeding. The prep was inadeqate.  Moderate diverticulosis in the descending colon and in the transverse colon. Two 6 to 9 mm polyps in the sigmoid colon, removed with a cold biopsy forceps. Resected and retrieved. Path TA. Clips (MR conditional) were placed. There is a polyp with deep ulcer at its apex at 20 cm likely inflammatory not removed. The floor of cecum was not well seen.     Non-smoker/No ETOH/No Illicit drug use  No family history of CRC or IBD    Past Medical History:   Diagnosis Date    Acute laryngitis 10/08/2018    Acute laryngitis    Body mass index (BMI) " 27.0-27.9, adult 08/24/2021    BMI 27.0-27.9,adult    Body mass index (BMI) 31.0-31.9, adult 02/22/2019    BMI 31.0-31.9,adult    Contusion of left front wall of thorax, subsequent encounter 02/17/2022    Contusion of left chest wall, subsequent encounter    Dorsalgia, unspecified 09/05/2017    Acute back pain    Other symptoms and signs involving the genitourinary system 04/08/2022    Abnormal urine color    Personal history of other diseases of the digestive system     History of diverticulitis of colon    Personal history of other endocrine, nutritional and metabolic disease 03/22/2018    History of hyperkalemia    Personal history of other infectious and parasitic diseases 10/08/2018    History of viral gastroenteritis    Personal history of other specified conditions 12/04/2017    History of epistaxis    Personal history of other specified conditions 11/13/2019    History of diarrhea    Personal history of other specified conditions 12/03/2019    History of abdominal pain    Personal history of other venous thrombosis and embolism 01/16/2018    History of deep venous thrombosis    Personal history of urinary (tract) infections 12/05/2022    History of urinary tract infection       Past Surgical History:   Procedure Laterality Date    OTHER SURGICAL HISTORY  01/27/2019    Knee arthroscopy       Allergies   Allergen Reactions    Colchicine Diarrhea       Review of Systems   Gastrointestinal:  Positive for abdominal pain. Negative for anal bleeding and blood in stool.   Genitourinary:  Positive for dysuria and urgency.       Physical Exam  Constitutional:       Appearance: Normal appearance.   HENT:      Head: Atraumatic.      Mouth/Throat:      Mouth: Mucous membranes are moist.   Eyes:      Conjunctiva/sclera: Conjunctivae normal.   Pulmonary:      Effort: Pulmonary effort is normal.   Abdominal:      General: There is no distension.      Palpations: Abdomen is soft.      Tenderness: There is no guarding or  rebound.      Hernia: A hernia is present.   Neurological:      Mental Status: She is alert.         Assessment and Plan:   #Air in bladder  #Sigmoid colon thickening  #Recurrent UTIs  #Chronic heart failure, EF 20-25%  #Insulin dependent DM  #COPD  #Overweight     -  Patient [facility] by her GI and will be receiving consultation today.  -  Discussed options with patient including diverting colostomy, possible attempt at primary resection with or without re-anastomosis.  -  Given her cardiac function, may benefit from diverting colostomy versus Paulo's procedure.  -  She will call us back if she wishes to continue with surgical evaluation/management here at .    Kenroy Maradiaga MD   11/28/2023  6:40 PM

## 2023-11-17 ENCOUNTER — CLINICAL SUPPORT (OUTPATIENT)
Dept: WOUND CARE | Facility: CLINIC | Age: 78
End: 2023-11-17
Payer: MEDICARE

## 2023-11-17 PROCEDURE — 29581 APPL MULTLAYER CMPRN SYS LEG: CPT

## 2023-11-22 ENCOUNTER — OFFICE VISIT (OUTPATIENT)
Dept: WOUND CARE | Facility: CLINIC | Age: 78
End: 2023-11-22
Payer: MEDICARE

## 2023-11-22 PROCEDURE — 11042 DBRDMT SUBQ TIS 1ST 20SQCM/<: CPT

## 2023-11-28 ENCOUNTER — OFFICE VISIT (OUTPATIENT)
Dept: SURGERY | Facility: CLINIC | Age: 78
End: 2023-11-28
Payer: MEDICARE

## 2023-11-28 VITALS
HEIGHT: 61 IN | BODY MASS INDEX: 29.64 KG/M2 | DIASTOLIC BLOOD PRESSURE: 84 MMHG | HEART RATE: 80 BPM | SYSTOLIC BLOOD PRESSURE: 140 MMHG | WEIGHT: 157 LBS

## 2023-11-28 DIAGNOSIS — K57.32 SIGMOID DIVERTICULITIS: ICD-10-CM

## 2023-11-28 DIAGNOSIS — R93.41 ABNORMAL CT SCAN, BLADDER: ICD-10-CM

## 2023-11-28 DIAGNOSIS — K42.9 UMBILICAL HERNIA WITHOUT OBSTRUCTION AND WITHOUT GANGRENE: ICD-10-CM

## 2023-11-28 DIAGNOSIS — J44.9 CHRONIC OBSTRUCTIVE PULMONARY DISEASE, UNSPECIFIED COPD TYPE (MULTI): ICD-10-CM

## 2023-11-28 DIAGNOSIS — N39.0 RECURRENT UTI: Primary | ICD-10-CM

## 2023-11-28 PROCEDURE — 99214 OFFICE O/P EST MOD 30 MIN: CPT | Performed by: STUDENT IN AN ORGANIZED HEALTH CARE EDUCATION/TRAINING PROGRAM

## 2023-11-28 PROCEDURE — 1160F RVW MEDS BY RX/DR IN RCRD: CPT | Performed by: STUDENT IN AN ORGANIZED HEALTH CARE EDUCATION/TRAINING PROGRAM

## 2023-11-28 PROCEDURE — 1126F AMNT PAIN NOTED NONE PRSNT: CPT | Performed by: STUDENT IN AN ORGANIZED HEALTH CARE EDUCATION/TRAINING PROGRAM

## 2023-11-28 PROCEDURE — 1036F TOBACCO NON-USER: CPT | Performed by: STUDENT IN AN ORGANIZED HEALTH CARE EDUCATION/TRAINING PROGRAM

## 2023-11-28 PROCEDURE — 3077F SYST BP >= 140 MM HG: CPT | Performed by: STUDENT IN AN ORGANIZED HEALTH CARE EDUCATION/TRAINING PROGRAM

## 2023-11-28 PROCEDURE — 3079F DIAST BP 80-89 MM HG: CPT | Performed by: STUDENT IN AN ORGANIZED HEALTH CARE EDUCATION/TRAINING PROGRAM

## 2023-11-28 PROCEDURE — 1159F MED LIST DOCD IN RCRD: CPT | Performed by: STUDENT IN AN ORGANIZED HEALTH CARE EDUCATION/TRAINING PROGRAM

## 2023-11-28 ASSESSMENT — ENCOUNTER SYMPTOMS
ANAL BLEEDING: 0
ABDOMINAL PAIN: 1
DYSURIA: 1
BLOOD IN STOOL: 0

## 2023-11-29 ENCOUNTER — OFFICE VISIT (OUTPATIENT)
Dept: WOUND CARE | Facility: CLINIC | Age: 78
End: 2023-11-29
Payer: MEDICARE

## 2023-11-29 PROCEDURE — 11042 DBRDMT SUBQ TIS 1ST 20SQCM/<: CPT

## 2023-12-06 ENCOUNTER — OFFICE VISIT (OUTPATIENT)
Dept: WOUND CARE | Facility: CLINIC | Age: 78
End: 2023-12-06
Payer: MEDICARE

## 2023-12-06 PROCEDURE — 11042 DBRDMT SUBQ TIS 1ST 20SQCM/<: CPT

## 2023-12-08 ENCOUNTER — APPOINTMENT (OUTPATIENT)
Dept: RESPIRATORY THERAPY | Facility: HOSPITAL | Age: 78
End: 2023-12-08
Payer: MEDICARE

## 2023-12-13 ENCOUNTER — APPOINTMENT (OUTPATIENT)
Dept: WOUND CARE | Facility: CLINIC | Age: 78
End: 2023-12-13
Payer: MEDICARE

## 2024-02-05 ENCOUNTER — APPOINTMENT (OUTPATIENT)
Dept: PRIMARY CARE | Facility: CLINIC | Age: 79
End: 2024-02-05
Payer: MEDICARE

## 2024-02-13 ENCOUNTER — APPOINTMENT (OUTPATIENT)
Dept: PULMONOLOGY | Facility: CLINIC | Age: 79
End: 2024-02-13
Payer: MEDICARE

## 2024-02-29 ENCOUNTER — DOCUMENTATION (OUTPATIENT)
Dept: PRIMARY CARE | Facility: CLINIC | Age: 79
End: 2024-02-29
Payer: MEDICARE